# Patient Record
Sex: FEMALE | Race: WHITE | Employment: OTHER | URBAN - METROPOLITAN AREA
[De-identification: names, ages, dates, MRNs, and addresses within clinical notes are randomized per-mention and may not be internally consistent; named-entity substitution may affect disease eponyms.]

---

## 2017-02-27 RX ORDER — LOSARTAN POTASSIUM 50 MG/1
TABLET ORAL
Qty: 90 TABLET | Refills: 3 | Status: SHIPPED | OUTPATIENT
Start: 2017-02-27 | End: 2017-06-23

## 2017-03-03 RX ORDER — ALENDRONATE SODIUM 70 MG/1
TABLET ORAL
Qty: 12 TABLET | Refills: 3 | Status: SHIPPED | OUTPATIENT
Start: 2017-03-03 | End: 2017-05-15

## 2017-04-14 ENCOUNTER — TELEPHONE (OUTPATIENT)
Dept: INTERNAL MEDICINE CLINIC | Facility: CLINIC | Age: 77
End: 2017-04-14

## 2017-04-14 RX ORDER — AZITHROMYCIN 250 MG/1
TABLET, FILM COATED ORAL
Qty: 6 TABLET | Refills: 0 | Status: SHIPPED | OUTPATIENT
Start: 2017-04-14 | End: 2017-05-15

## 2017-04-14 RX ORDER — ALBUTEROL SULFATE 90 UG/1
2 AEROSOL, METERED RESPIRATORY (INHALATION) EVERY 4 HOURS PRN
Qty: 1 INHALER | Refills: 1 | Status: SHIPPED | OUTPATIENT
Start: 2017-04-14 | End: 2017-06-23

## 2017-04-14 NOTE — TELEPHONE ENCOUNTER
Called patient. She reports symptoms started 2 days ago and today she is feeling somewhat better. Does not want to be stuck over the holiday weekend without medication if needed. Denies fevers. Reports cough with minimal thin white sputum and runny nose.  [place]

## 2017-04-14 NOTE — TELEPHONE ENCOUNTER
I have pended a Z-Myron, and an albuterol inhaler above.   I would have her start using the albuterol inhaler at least 3-4 times a day, 2 puffs, and start the Z-Myron, she is still getting worse over the weekend she should be in the immediate care emergency enmanuel

## 2017-04-14 NOTE — TELEPHONE ENCOUNTER
Called patient and relayed information/instructions from Dr. Idalmis Warren. Patient verbalized understanding and is agreeable to plan. Med eRx'd.

## 2017-04-14 NOTE — TELEPHONE ENCOUNTER
Pt has runny nose and cough wants something for cough and runny nose - can not take liquid  meds    And asthma a little bad and wants an inhaler     [facility]     Pt can not come in today and is asking for something     Does not have car

## 2017-05-05 ENCOUNTER — TELEPHONE (OUTPATIENT)
Dept: INTERNAL MEDICINE CLINIC | Facility: CLINIC | Age: 77
End: 2017-05-05

## 2017-05-05 ENCOUNTER — APPOINTMENT (OUTPATIENT)
Dept: LAB | Facility: HOSPITAL | Age: 77
End: 2017-05-05
Attending: INTERNAL MEDICINE
Payer: MEDICARE

## 2017-05-05 DIAGNOSIS — N39.0 URINARY TRACT INFECTION, SITE UNSPECIFIED: ICD-10-CM

## 2017-05-05 DIAGNOSIS — N39.0 URINARY TRACT INFECTION, SITE UNSPECIFIED: Primary | ICD-10-CM

## 2017-05-05 PROCEDURE — 87086 URINE CULTURE/COLONY COUNT: CPT

## 2017-05-05 PROCEDURE — 81003 URINALYSIS AUTO W/O SCOPE: CPT

## 2017-05-05 NOTE — TELEPHONE ENCOUNTER
Please advise - called patient who states she has incontinence for years , but its getting worse , and if she is not close to a bathroom she cant hold it , urine just comes out - to DR. FAIRBANKS

## 2017-05-05 NOTE — TELEPHONE ENCOUNTER
Orders entered per DR. FAIRBANKS - called patient who put shannan Pathak on the phone - relayed MAGDI OVIEDO message and she verbalized understanding.  DR Jordon Blair # [phone] given to shannan

## 2017-05-05 NOTE — TELEPHONE ENCOUNTER
Get u/a and C&S today  - this problem is not my field and she needs to see urologist - refer to Dr Dane weems.    We will call her with results net week.  If not doing well i want he to go to ED

## 2017-05-05 NOTE — TELEPHONE ENCOUNTER
Pt said that she has a problem with her bladder. She wanted to see  today and have a test (urine?).                       Tasked to Nursing. high

## 2017-05-08 NOTE — TELEPHONE ENCOUNTER
I did d/w pt - u/a and culture are negative. She has appointment to see Dr Albert Ernst 5/25. She has occasional incontinence.

## 2017-05-15 ENCOUNTER — OFFICE VISIT (OUTPATIENT)
Dept: INTERNAL MEDICINE CLINIC | Facility: CLINIC | Age: 77
End: 2017-05-15

## 2017-05-15 ENCOUNTER — LAB ENCOUNTER (OUTPATIENT)
Dept: LAB | Age: 77
End: 2017-05-15
Attending: INTERNAL MEDICINE
Payer: MEDICARE

## 2017-05-15 ENCOUNTER — PRIOR ORIGINAL RECORDS (OUTPATIENT)
Dept: OTHER | Age: 77
End: 2017-05-15

## 2017-05-15 VITALS
TEMPERATURE: 98 F | WEIGHT: 175 LBS | SYSTOLIC BLOOD PRESSURE: 164 MMHG | DIASTOLIC BLOOD PRESSURE: 70 MMHG | HEIGHT: 61 IN | BODY MASS INDEX: 33.04 KG/M2 | HEART RATE: 60 BPM

## 2017-05-15 DIAGNOSIS — R32 URINARY INCONTINENCE, UNSPECIFIED TYPE: ICD-10-CM

## 2017-05-15 DIAGNOSIS — E55.9 VITAMIN D DEFICIENCY: ICD-10-CM

## 2017-05-15 DIAGNOSIS — I10 ESSENTIAL HYPERTENSION: ICD-10-CM

## 2017-05-15 DIAGNOSIS — Z00.00 ENCOUNTER FOR MEDICARE ANNUAL WELLNESS EXAM: ICD-10-CM

## 2017-05-15 DIAGNOSIS — E78.00 HYPERCHOLESTEREMIA: ICD-10-CM

## 2017-05-15 DIAGNOSIS — R10.13 EPIGASTRIC PAIN: Primary | ICD-10-CM

## 2017-05-15 DIAGNOSIS — R10.13 EPIGASTRIC PAIN: ICD-10-CM

## 2017-05-15 DIAGNOSIS — R06.00 DYSPNEA, UNSPECIFIED TYPE: ICD-10-CM

## 2017-05-15 PROBLEM — R10.9 ABDOMINAL PAIN: Status: ACTIVE | Noted: 2017-05-15

## 2017-05-15 PROCEDURE — G0439 PPPS, SUBSEQ VISIT: HCPCS | Performed by: INTERNAL MEDICINE

## 2017-05-15 PROCEDURE — 36415 COLL VENOUS BLD VENIPUNCTURE: CPT

## 2017-05-15 PROCEDURE — 85025 COMPLETE CBC W/AUTO DIFF WBC: CPT

## 2017-05-15 PROCEDURE — 80061 LIPID PANEL: CPT

## 2017-05-15 PROCEDURE — 84443 ASSAY THYROID STIM HORMONE: CPT

## 2017-05-15 PROCEDURE — 80053 COMPREHEN METABOLIC PANEL: CPT

## 2017-05-15 PROCEDURE — G0463 HOSPITAL OUTPT CLINIC VISIT: HCPCS | Performed by: INTERNAL MEDICINE

## 2017-05-15 PROCEDURE — 82306 VITAMIN D 25 HYDROXY: CPT

## 2017-05-15 PROCEDURE — 99214 OFFICE O/P EST MOD 30 MIN: CPT | Performed by: INTERNAL MEDICINE

## 2017-05-15 RX ORDER — OMEPRAZOLE 20 MG/1
20 CAPSULE, DELAYED RELEASE ORAL
Qty: 30 CAPSULE | Refills: 3 | Status: SHIPPED | OUTPATIENT
Start: 2017-05-15 | End: 2017-06-09

## 2017-05-15 NOTE — PROGRESS NOTES
Antionette Hodges is a 76year old female. HPI:   She is here for annual wellness [facility] evaluation - she has several complaints including minor dyspnea with mild exertion, tinnitus in the right ear and now the recent onset of epigastric discomfort as below. GENERAL HEALTH: feels well otherwise  SKIN: denies any unusual skin lesions or rashes  RESPIRATORY: denies shortness of breath with exertion  CARDIOVASCULAR: denies chest pain on exertion  GI: denies abdominal pain and denies heartburn  NEURO: denies hea No    Difficulty dressing or bathing?: No    Problems with daily activities? : No    Memory Problems?: No      Fall/Risk Assessment     Do you have 3 or more medical conditions?: 0-No    Have you fallen in the last 12 months?: 0-No    Do you accidently los I misunderstand what others are saying and make inappropriate responses:    No I avoid social activities because I cannot hear well and fear I will   reply improperly:  No   Family members and friends have told me they think I may have hearing   loss:   Junior Elaine 05/01/2015    No flowsheet data found. Pap and Pelvic      Pap: Every 3 yrs age 21-68 or Pap+HPV every 5 yrs age 33-67, age 72 and older at high risk   There are no preventive care reminders to display for this patient.  Update Health Maintenance if appli flowsheet data found. COPD      Spirometry Testing Annually No results found for this or any previous visit. No flowsheet data found. ASSESSMENT AND PLAN:   1.  Epigastric pain - no evident etiology, give empiric omeprazole 20 mg and recheck i

## 2017-05-17 ENCOUNTER — TELEPHONE (OUTPATIENT)
Dept: INTERNAL MEDICINE CLINIC | Facility: CLINIC | Age: 77
End: 2017-05-17

## 2017-05-17 LAB
ALBUMIN: 3.7 G/DL
ALKALINE PHOSPHATATE(ALK PHOS): 49 IU/L
ALT (SGPT): 14 U/L
AST (SGOT): 16 U/L
BILIRUBIN TOTAL: 1.3 MG/DL
BUN: 15 MG/DL
CALCIUM: 9.1 MG/DL
CHLORIDE: 108 MEQ/L
CHOLESTEROL, TOTAL: 169 MG/DL
CREATININE, SERUM: 1.08 MG/DL
GLOBULIN: 2.7 G/DL
GLUCOSE: 88 MG/DL
GLUCOSE: 88 MG/DL
HDL CHOLESTEROL: 42 MG/DL
HEMATOCRIT: 43.8 %
HEMOGLOBIN: 14.7 G/DL
LDL CHOLESTEROL: 97 MG/DL
MCH: 30.6 PG
MCHC: 33.5 G/DL
MCV: 91.4 FL
NON-HDL CHOLESTEROL: 127 MG/DL
PLATELETS: 176 K/UL
POTASSIUM, SERUM: 4.6 MEQ/L
PROTEIN, TOTAL: 6.4 G/DL
RED BLOOD COUNT: 4.79 X 10-6/U
SGOT (AST): 16 IU/L
SGPT (ALT): 14 IU/L
SODIUM: 144 MEQ/L
TRIGLYCERIDES: 152 MG/DL
WHITE BLOOD COUNT: 5.1 X 10-3/U

## 2017-05-18 ENCOUNTER — MYAURORA ACCOUNT LINK (OUTPATIENT)
Dept: OTHER | Age: 77
End: 2017-05-18

## 2017-05-18 ENCOUNTER — PRIOR ORIGINAL RECORDS (OUTPATIENT)
Dept: OTHER | Age: 77
End: 2017-05-18

## 2017-05-22 ENCOUNTER — HOSPITAL ENCOUNTER (OUTPATIENT)
Dept: RESPIRATORY THERAPY | Facility: HOSPITAL | Age: 77
Discharge: HOME | End: 2017-05-22
Attending: INTERNAL MEDICINE
Payer: MEDICARE

## 2017-05-22 DIAGNOSIS — R06.00 DYSPNEA: ICD-10-CM

## 2017-05-22 PROCEDURE — 94060 EVALUATION OF WHEEZING: CPT

## 2017-05-22 PROCEDURE — 94726 PLETHYSMOGRAPHY LUNG VOLUMES: CPT

## 2017-05-22 PROCEDURE — 94729 DIFFUSING CAPACITY: CPT

## 2017-05-24 ENCOUNTER — PRIOR ORIGINAL RECORDS (OUTPATIENT)
Dept: OTHER | Age: 77
End: 2017-05-24

## 2017-05-26 ENCOUNTER — OFFICE VISIT (OUTPATIENT)
Dept: SURGERY | Facility: CLINIC | Age: 77
End: 2017-05-26

## 2017-05-26 VITALS
HEIGHT: 61 IN | WEIGHT: 177 LBS | SYSTOLIC BLOOD PRESSURE: 156 MMHG | DIASTOLIC BLOOD PRESSURE: 79 MMHG | HEART RATE: 60 BPM | BODY MASS INDEX: 33.42 KG/M2 | RESPIRATION RATE: 16 BRPM | TEMPERATURE: 98 F

## 2017-05-26 DIAGNOSIS — N39.41 URGE INCONTINENCE: Primary | ICD-10-CM

## 2017-05-26 PROCEDURE — 99204 OFFICE O/P NEW MOD 45 MIN: CPT | Performed by: UROLOGY

## 2017-05-26 PROCEDURE — G0463 HOSPITAL OUTPT CLINIC VISIT: HCPCS | Performed by: UROLOGY

## 2017-05-26 RX ORDER — SOLIFENACIN SUCCINATE 5 MG/1
5 TABLET, FILM COATED ORAL DAILY
Qty: 30 TABLET | Refills: 11 | Status: SHIPPED | OUTPATIENT
Start: 2017-05-26 | End: 2017-06-23

## 2017-05-26 NOTE — PROGRESS NOTES
SUBJECTIVE:  Cadence Portillo is a 76year old female who presents for a consultation at the request of, and a copy of this note will be sent to, Dr. Raudel Hester, for evaluation of  Urinary urgency and urge incontinence.   Symptoms chronic but have been worse l (36.7 °C) (Oral)  Resp 16  Ht 5' 1\" (1.549 m)  Wt 177 lb (80.287 kg)  BMI 33.46 kg/m2  She appears well, in no apparent distress. Alert and oriented times three, pleasant and cooperative.   CARDIOVASCULAR:normal apical impulse  RESPIRATORY: no chest wall

## 2017-06-02 ENCOUNTER — OFFICE VISIT (OUTPATIENT)
Dept: OTOLARYNGOLOGY | Facility: CLINIC | Age: 77
End: 2017-06-02

## 2017-06-02 VITALS — HEIGHT: 61 IN | TEMPERATURE: 98 F | WEIGHT: 175 LBS | BODY MASS INDEX: 33.04 KG/M2

## 2017-06-02 DIAGNOSIS — H93.11 TINNITUS, RIGHT: Primary | ICD-10-CM

## 2017-06-02 PROCEDURE — G0463 HOSPITAL OUTPT CLINIC VISIT: HCPCS | Performed by: OTOLARYNGOLOGY

## 2017-06-02 PROCEDURE — 99213 OFFICE O/P EST LOW 20 MIN: CPT | Performed by: OTOLARYNGOLOGY

## 2017-06-03 NOTE — PROGRESS NOTES
Kaur Sullivan is a 76year old female. Patient presents with:  Ear Problem: ringing in ear right ear for past 7 months     HPI:   Developed ringing in her right ear last year. Noted to have an asymmetric hearing loss. MRI neg.  No real change in her he gain  SKIN: denies any unusual skin lesions or rashes  RESPIRATORY: denies shortness of breath with exertion  NEURO: denies headaches    EXAM:   Temp(Src) 97.7 °F (36.5 °C) (Tympanic)  Ht 5' 1\" (1.549 m)  Wt 175 lb (79.379 kg)  BMI 33.08 kg/m2  System [address]

## 2017-06-03 NOTE — PATIENT INSTRUCTIONS
Tinnitus (Ringing in the Ears)     Treatment may include maskers and hearing aids. Tinnitus is the term for a noise in your ear not caused by an outside sound. The noise might be a ringing, clicking, hiss, or roar.  It can vary in pitch and may be sof © 4028-7963 [address], [address] [place]. All rights reserved. This information is not intended as a substitute for professional medical care. Always follow your healthcare professional's instructions.

## 2017-06-04 ENCOUNTER — TELEPHONE (OUTPATIENT)
Dept: INTERNAL MEDICINE CLINIC | Facility: CLINIC | Age: 77
End: 2017-06-04

## 2017-06-04 DIAGNOSIS — R79.89 LOW VITAMIN D LEVEL: Primary | ICD-10-CM

## 2017-06-04 NOTE — TELEPHONE ENCOUNTER
Labs good but Vit D low - is she taking her Vit D 50,000 us once per week? ?  I suspect not. Maybe easier for her to get Vit D 5000 (five thousand) units and take once daily.  Repeat Vit D about 3 months

## 2017-06-05 NOTE — TELEPHONE ENCOUNTER
Called and Relayed MD's message to patient---verbalized understanding. Patient states she is taking Vitamin D 50,000units weekly, but does admit to occasionally forget to take it. Instructed per Dr. Bre Brady to repeat Vit D level in 3 months.  Order in place

## 2017-06-09 ENCOUNTER — OFFICE VISIT (OUTPATIENT)
Dept: INTERNAL MEDICINE CLINIC | Facility: CLINIC | Age: 77
End: 2017-06-09

## 2017-06-09 VITALS
DIASTOLIC BLOOD PRESSURE: 76 MMHG | HEIGHT: 61 IN | HEART RATE: 60 BPM | TEMPERATURE: 99 F | WEIGHT: 171.81 LBS | OXYGEN SATURATION: 96 % | SYSTOLIC BLOOD PRESSURE: 132 MMHG | BODY MASS INDEX: 32.44 KG/M2

## 2017-06-09 DIAGNOSIS — I10 ESSENTIAL HYPERTENSION: Primary | ICD-10-CM

## 2017-06-09 DIAGNOSIS — R06.00 DYSPNEA ON EXERTION: ICD-10-CM

## 2017-06-09 DIAGNOSIS — M25.552 PAIN, JOINT, HIP, LEFT: ICD-10-CM

## 2017-06-09 PROCEDURE — 99214 OFFICE O/P EST MOD 30 MIN: CPT | Performed by: INTERNAL MEDICINE

## 2017-06-09 PROCEDURE — G0463 HOSPITAL OUTPT CLINIC VISIT: HCPCS | Performed by: INTERNAL MEDICINE

## 2017-06-19 ENCOUNTER — PRIOR ORIGINAL RECORDS (OUTPATIENT)
Dept: OTHER | Age: 77
End: 2017-06-19

## 2017-06-19 ENCOUNTER — MYAURORA ACCOUNT LINK (OUTPATIENT)
Dept: OTHER | Age: 77
End: 2017-06-19

## 2017-06-22 ENCOUNTER — PRIOR ORIGINAL RECORDS (OUTPATIENT)
Dept: OTHER | Age: 77
End: 2017-06-22

## 2017-06-23 ENCOUNTER — OFFICE VISIT (OUTPATIENT)
Dept: SURGERY | Facility: CLINIC | Age: 77
End: 2017-06-23

## 2017-06-23 VITALS
DIASTOLIC BLOOD PRESSURE: 79 MMHG | TEMPERATURE: 99 F | BODY MASS INDEX: 32.28 KG/M2 | HEIGHT: 61.2 IN | HEART RATE: 54 BPM | SYSTOLIC BLOOD PRESSURE: 145 MMHG | WEIGHT: 171 LBS

## 2017-06-23 DIAGNOSIS — N39.41 URGE INCONTINENCE: Primary | ICD-10-CM

## 2017-06-23 PROCEDURE — 99213 OFFICE O/P EST LOW 20 MIN: CPT | Performed by: UROLOGY

## 2017-06-23 PROCEDURE — G0463 HOSPITAL OUTPT CLINIC VISIT: HCPCS | Performed by: UROLOGY

## 2017-06-23 RX ORDER — SOLIFENACIN SUCCINATE 5 MG/1
5 TABLET, FILM COATED ORAL DAILY
Qty: 90 TABLET | Refills: 3 | Status: SHIPPED | OUTPATIENT
Start: 2017-06-23 | End: 2017-09-22

## 2017-06-23 NOTE — PROGRESS NOTES
Silvia Pope is a 76year old female.     HPI:   Patient presents with:  Urinary Symptoms (urologic): patient presents for incontinence f/u is currently on vesicare 11g    75-year-old female with overactive bladder symptoms for which she was seen in c PHYSICAL EXAM:        ASSESSMENT/PLAN:   Assessment  Urge incontinence  (primary encounter diagnosis)    Reviewed findings with patient. Recommended follow-up in 6 months. She will call if she has any problems or concerns.          Orders This Visit

## 2017-06-27 ENCOUNTER — PRIOR ORIGINAL RECORDS (OUTPATIENT)
Dept: OTHER | Age: 77
End: 2017-06-27

## 2017-06-27 ENCOUNTER — MYAURORA ACCOUNT LINK (OUTPATIENT)
Dept: OTHER | Age: 77
End: 2017-06-27

## 2017-06-27 ENCOUNTER — TELEPHONE (OUTPATIENT)
Dept: INTERNAL MEDICINE CLINIC | Facility: CLINIC | Age: 77
End: 2017-06-27

## 2017-06-27 NOTE — TELEPHONE ENCOUNTER
Pt is calling for results of a Pulmonary test done a couple weeks ago done at [address] [place] [address] Dr Joon Neumann instructed pt to call Dr FAIRBANKS for results. Pt also needs results for hemoccult that was sent in. Please call pt [phone] pt will be home around 5 ok to [facility].   Tasked

## 2017-06-28 NOTE — TELEPHONE ENCOUNTER
I did leave message that PFT normal but Cologuard test positive.      Send her two Hemoccult slides and 4 tongue depressors with instruction to take two samples from one stool and make tnin smear on each of the panels on one card and then the same for a sec

## 2017-06-29 NOTE — TELEPHONE ENCOUNTER
Please call pt, has questions about results  Pt can be reached on cell at [phone]  Tasked to nursing

## 2017-06-29 NOTE — TELEPHONE ENCOUNTER
Patient called back. She states she would like to discuss abnormal test results with Dr. Ramandeep Villa in person. She did receive Dr. Jeanette Alves . Offered patient appt tomorrow afternoon at [facility], but she states she will call back in the morning.  States she just

## 2017-06-30 NOTE — TELEPHONE ENCOUNTER
Pt. Is calling to speak with Dr. Shelley Ferrell  Regarding her results & would like to be seen today. Pt.  Was offered to be seen at [facility], but she would like to come here there are no openings please advise    Ph. # [phone]   Routed  To clinical

## 2017-07-03 ENCOUNTER — OFFICE VISIT (OUTPATIENT)
Dept: INTERNAL MEDICINE CLINIC | Facility: CLINIC | Age: 77
End: 2017-07-03

## 2017-07-03 VITALS
DIASTOLIC BLOOD PRESSURE: 72 MMHG | WEIGHT: 172 LBS | TEMPERATURE: 99 F | HEIGHT: 61.2 IN | HEART RATE: 54 BPM | BODY MASS INDEX: 32.47 KG/M2 | OXYGEN SATURATION: 98 % | SYSTOLIC BLOOD PRESSURE: 120 MMHG

## 2017-07-03 DIAGNOSIS — Z80.0 FAMILY HISTORY OF COLON CANCER REQUIRING SCREENING COLONOSCOPY: Primary | ICD-10-CM

## 2017-07-03 PROCEDURE — G0463 HOSPITAL OUTPT CLINIC VISIT: HCPCS | Performed by: INTERNAL MEDICINE

## 2017-07-03 PROCEDURE — 99214 OFFICE O/P EST MOD 30 MIN: CPT | Performed by: INTERNAL MEDICINE

## 2017-07-03 RX ORDER — LISINOPRIL 40 MG/1
1 TABLET ORAL DAILY
COMMUNITY
Start: 2017-06-28 | End: 2018-11-02 | Stop reason: ALTCHOICE

## 2017-07-03 NOTE — PROGRESS NOTES
Prabha Molina is a 76year old female. HPI:   1.  Family history of colon cancer requiring screening colonoscopy -  Her mother  at age 39 of colon cancer, brother age 80 -  Dx colon cancer 3 yrs ago - pt has never had colonoscopy - she says she ab Comment: occasionally       REVIEW OF SYSTEMS:   GENERAL HEALTH: feels well otherwise  SKIN: denies any unusual skin lesions or rashes  RESPIRATORY: denies shortness of breath with exertion  CARDIOVASCULAR: denies chest pain on exertion  GI: denies abdomin

## 2017-07-04 ENCOUNTER — APPOINTMENT (OUTPATIENT)
Dept: LAB | Age: 77
End: 2017-07-04
Attending: INTERNAL MEDICINE
Payer: MEDICARE

## 2017-07-04 DIAGNOSIS — Z12.11 SCREENING FOR COLON CANCER: ICD-10-CM

## 2017-07-04 PROCEDURE — 82270 OCCULT BLOOD FECES: CPT

## 2017-07-08 LAB
HEMOCCULT STL QL: NEGATIVE
HEMOCCULT STL QL: NEGATIVE

## 2017-07-10 ENCOUNTER — TELEPHONE (OUTPATIENT)
Dept: INTERNAL MEDICINE CLINIC | Facility: CLINIC | Age: 77
End: 2017-07-10

## 2017-07-10 NOTE — TELEPHONE ENCOUNTER
Patient returned call and writer reiterated MDs message. Patient verbalized that she needs to call and follow up with Dr. Chanell Rivera.

## 2017-07-10 NOTE — TELEPHONE ENCOUNTER
[phone]  Pt would like to speak to Dr FAIRBANKS about colonoscopy prep. Pt can't do liquid and would rather swallow pill.  Pt doesn't want to make appt to discuss w/ Dr Jewel Daniel  To clinical

## 2017-07-10 NOTE — TELEPHONE ENCOUNTER
I did leave message on VM - I feel she needs to see Dr Jayro Rodriguez to make a plan and I had already spoken to Dr Jayro Rodriguez about her special problem of preparation for colonoscopy.

## 2017-09-01 RX ORDER — PRAVASTATIN SODIUM 40 MG
TABLET ORAL
Qty: 90 TABLET | Refills: 3 | Status: SHIPPED | OUTPATIENT
Start: 2017-09-01 | End: 2018-09-23

## 2017-09-22 ENCOUNTER — OFFICE VISIT (OUTPATIENT)
Dept: INTERNAL MEDICINE CLINIC | Facility: CLINIC | Age: 77
End: 2017-09-22

## 2017-09-22 VITALS
HEIGHT: 60 IN | OXYGEN SATURATION: 98 % | HEART RATE: 53 BPM | SYSTOLIC BLOOD PRESSURE: 124 MMHG | BODY MASS INDEX: 33.67 KG/M2 | WEIGHT: 171.5 LBS | DIASTOLIC BLOOD PRESSURE: 80 MMHG | TEMPERATURE: 98 F

## 2017-09-22 DIAGNOSIS — Z12.39 BREAST CANCER SCREENING: ICD-10-CM

## 2017-09-22 DIAGNOSIS — E78.00 HYPERCHOLESTEREMIA: ICD-10-CM

## 2017-09-22 DIAGNOSIS — I10 ESSENTIAL HYPERTENSION: ICD-10-CM

## 2017-09-22 DIAGNOSIS — R06.00 DYSPNEA ON EXERTION: Primary | ICD-10-CM

## 2017-09-22 PROCEDURE — 99214 OFFICE O/P EST MOD 30 MIN: CPT | Performed by: INTERNAL MEDICINE

## 2017-09-22 PROCEDURE — G0463 HOSPITAL OUTPT CLINIC VISIT: HCPCS | Performed by: INTERNAL MEDICINE

## 2017-09-22 RX ORDER — SOLIFENACIN SUCCINATE 5 MG/1
5 TABLET, FILM COATED ORAL DAILY
Qty: 90 TABLET | Refills: 3 | Status: SHIPPED | OUTPATIENT
Start: 2017-09-22 | End: 2017-10-10

## 2017-09-22 RX ORDER — AMITRIPTYLINE HYDROCHLORIDE 10 MG/1
10 TABLET, FILM COATED ORAL NIGHTLY
Qty: 30 TABLET | Refills: 6 | Status: SHIPPED | OUTPATIENT
Start: 2017-09-22 | End: 2017-10-10

## 2017-09-22 NOTE — PROGRESS NOTES
Prabha Molina is a 76year old female. HPI:     Prabha Molina is a 76year old female. HPI:   She is doing well - no major complaints no ED or UC visits. She c/o mild lightheadedness, no falls.     Headaches: periodic right parietal burning - Smokeless tobacco: Never Used                      Alcohol use: Yes              Comment: occasionally.  a couple drinks a month       REVIEW OF SYSTEMS:   GENERAL HEALTH: feels well otherwise  SKIN: denies any unusual skin lesions or rashes  RESPIR Tab Take 1 tablet by mouth daily. Disp:  Rfl:    Solifenacin Succinate (VESICARE) 5 MG Oral Tab Take 1 tablet (5 mg total) by mouth daily.  (Patient taking differently: Take 5 mg by mouth every other day.  ) Disp: 90 tablet Rfl: 3   ERGOCALCIFEROL 56982 U meds      Headache - trial Elavil 10 mg HS, she has had neg MRI, carotids, etc.      The patient indicates understanding of these issues and agrees to the plan.   The patient is asked to return in 4 months

## 2017-09-25 ENCOUNTER — TELEPHONE (OUTPATIENT)
Dept: INTERNAL MEDICINE CLINIC | Facility: CLINIC | Age: 77
End: 2017-09-25

## 2017-09-25 NOTE — TELEPHONE ENCOUNTER
[facility] for Amitriptyline HCL 10 mg. T: [phone], ID# [id number]. Placed in purple folder.     Tasked to Rx

## 2017-09-29 NOTE — TELEPHONE ENCOUNTER
[employer] faxed a note - pt is not enrolled in their plan. Fax placed to be scanned.        Tasked to Rx

## 2017-10-03 ENCOUNTER — HOSPITAL ENCOUNTER (OUTPATIENT)
Dept: MAMMOGRAPHY | Facility: HOSPITAL | Age: 77
Discharge: HOME | End: 2017-10-03
Attending: INTERNAL MEDICINE
Payer: MEDICARE

## 2017-10-03 DIAGNOSIS — Z12.39 BREAST CANCER SCREENING: ICD-10-CM

## 2017-10-03 PROCEDURE — 77067 SCR MAMMO BI INCL CAD: CPT | Performed by: INTERNAL MEDICINE

## 2017-10-04 NOTE — TELEPHONE ENCOUNTER
[facility] faxed the PA request again. Note: \" [employer] fax # [phone] Pt and claim on file. Placed in purple folder.        Tasked to rx

## 2017-10-09 NOTE — TELEPHONE ENCOUNTER
[employer] faxed a Notice of approval - Amitriptyline HCL approved effective 12/30/16 through 12/31/17. Fax placed to be scanned.      Tasked to rx

## 2017-10-10 ENCOUNTER — TELEPHONE (OUTPATIENT)
Dept: INTERNAL MEDICINE CLINIC | Facility: CLINIC | Age: 77
End: 2017-10-10

## 2017-10-10 ENCOUNTER — OFFICE VISIT (OUTPATIENT)
Dept: INTERNAL MEDICINE CLINIC | Facility: CLINIC | Age: 77
End: 2017-10-10

## 2017-10-10 ENCOUNTER — HOSPITAL ENCOUNTER (OUTPATIENT)
Dept: GENERAL RADIOLOGY | Age: 77
Discharge: HOME | End: 2017-10-10
Attending: INTERNAL MEDICINE | Admitting: INTERNAL MEDICINE
Payer: MEDICARE

## 2017-10-10 VITALS
DIASTOLIC BLOOD PRESSURE: 84 MMHG | SYSTOLIC BLOOD PRESSURE: 130 MMHG | HEIGHT: 60 IN | WEIGHT: 174 LBS | HEART RATE: 72 BPM | TEMPERATURE: 98 F | BODY MASS INDEX: 34.16 KG/M2

## 2017-10-10 DIAGNOSIS — M54.50 ACUTE LOW BACK PAIN WITHOUT SCIATICA, UNSPECIFIED BACK PAIN LATERALITY: ICD-10-CM

## 2017-10-10 DIAGNOSIS — M54.50 ACUTE LOW BACK PAIN WITHOUT SCIATICA, UNSPECIFIED BACK PAIN LATERALITY: Primary | ICD-10-CM

## 2017-10-10 PROCEDURE — 72110 X-RAY EXAM L-2 SPINE 4/>VWS: CPT | Performed by: INTERNAL MEDICINE

## 2017-10-10 PROCEDURE — 99214 OFFICE O/P EST MOD 30 MIN: CPT | Performed by: INTERNAL MEDICINE

## 2017-10-10 PROCEDURE — G0463 HOSPITAL OUTPT CLINIC VISIT: HCPCS | Performed by: INTERNAL MEDICINE

## 2017-10-10 RX ORDER — METHYLPREDNISOLONE 4 MG/1
TABLET ORAL
Qty: 1 KIT | Refills: 0 | Status: SHIPPED | OUTPATIENT
Start: 2017-10-10 | End: 2018-01-22 | Stop reason: ALTCHOICE

## 2017-10-10 RX ORDER — TRAMADOL HYDROCHLORIDE 50 MG/1
25 TABLET ORAL EVERY 6 HOURS PRN
Qty: 30 TABLET | Refills: 1 | Status: SHIPPED
Start: 2017-10-10 | End: 2018-01-22

## 2017-10-10 NOTE — PROGRESS NOTES
Mckenzie Prasad is a 76year old female who presents for     Back pain. \"I always have some pain in my back. (since 70 yrs old). Now (since [place] 10/8/17) is worse. \"  Pain is in the midline of the low back traveling out to both sides of the low ba 174 lb (78.9 kg)  09/22/17 : 171 lb 8 oz (77.8 kg)  07/03/17 : 172 lb (78 kg)  06/23/17 : 171 lb (77.6 kg)  06/09/17 : 171 lb 12.8 oz (77.9 kg)  06/02/17 : 175 lb (79.4 kg)    bp on my recheck is 130/84. Was 150/86 on initial vitals.    General: appears wel

## 2017-10-10 NOTE — TELEPHONE ENCOUNTER
To nursing, please tell patient x-ray of the lumbar spine done on 10/10/17–results show degenerative arthritis. Avoid excessive lifting or bending.   Go ahead with the plan as we discussed as her visit today for Medrol Dosepak and tramadol for pain if need

## 2017-10-11 PROBLEM — Z80.0 FAMILY HISTORY OF COLON CANCER IN MOTHER: Status: ACTIVE | Noted: 2017-07-03

## 2017-10-11 NOTE — TELEPHONE ENCOUNTER
Pt notified  LS spine shows degenerative arthritis / DR F   Avoid excessive lifting or bending   Go ahead with the medrol dose deanna  And tramadol for pain   Will call when urine report is back

## 2017-10-30 ENCOUNTER — HOSPITAL ENCOUNTER (OUTPATIENT)
Facility: HOSPITAL | Age: 77
Setting detail: HOSPITAL OUTPATIENT SURGERY
Discharge: HOME | End: 2017-10-30
Attending: INTERNAL MEDICINE | Admitting: INTERNAL MEDICINE
Payer: MEDICARE

## 2017-10-30 ENCOUNTER — SURGERY (OUTPATIENT)
Age: 77
End: 2017-10-30

## 2017-10-30 VITALS
HEART RATE: 78 BPM | RESPIRATION RATE: 16 BRPM | HEIGHT: 61 IN | SYSTOLIC BLOOD PRESSURE: 148 MMHG | WEIGHT: 171 LBS | DIASTOLIC BLOOD PRESSURE: 71 MMHG | BODY MASS INDEX: 32.28 KG/M2 | OXYGEN SATURATION: 92 %

## 2017-10-30 DIAGNOSIS — R19.5 HEME + STOOL: ICD-10-CM

## 2017-10-30 DIAGNOSIS — Z80.0 FAMILY HISTORY OF COLON CANCER IN MOTHER: ICD-10-CM

## 2017-10-30 PROCEDURE — 88305 TISSUE EXAM BY PATHOLOGIST: CPT | Performed by: INTERNAL MEDICINE

## 2017-10-30 PROCEDURE — 0DBK8ZX EXCISION OF ASCENDING COLON, VIA NATURAL OR ARTIFICIAL OPENING ENDOSCOPIC, DIAGNOSTIC: ICD-10-PCS | Performed by: INTERNAL MEDICINE

## 2017-10-30 PROCEDURE — 3E0H8GC INTRODUCTION OF OTHER THERAPEUTIC SUBSTANCE INTO LOWER GI, VIA NATURAL OR ARTIFICIAL OPENING ENDOSCOPIC: ICD-10-PCS | Performed by: INTERNAL MEDICINE

## 2017-10-30 PROCEDURE — 0DBM8ZX EXCISION OF DESCENDING COLON, VIA NATURAL OR ARTIFICIAL OPENING ENDOSCOPIC, DIAGNOSTIC: ICD-10-PCS | Performed by: INTERNAL MEDICINE

## 2017-10-30 PROCEDURE — 84100 ASSAY OF PHOSPHORUS: CPT | Performed by: INTERNAL MEDICINE

## 2017-10-30 PROCEDURE — 80048 BASIC METABOLIC PNL TOTAL CA: CPT | Performed by: INTERNAL MEDICINE

## 2017-10-30 PROCEDURE — 0DBL8ZX EXCISION OF TRANSVERSE COLON, VIA NATURAL OR ARTIFICIAL OPENING ENDOSCOPIC, DIAGNOSTIC: ICD-10-PCS | Performed by: INTERNAL MEDICINE

## 2017-10-30 RX ORDER — POTASSIUM CHLORIDE 1.5 G/1.77G
40 POWDER, FOR SOLUTION ORAL ONCE
Status: DISCONTINUED | OUTPATIENT
Start: 2017-10-30 | End: 2017-10-30

## 2017-10-30 RX ORDER — SODIUM CHLORIDE, SODIUM LACTATE, POTASSIUM CHLORIDE, CALCIUM CHLORIDE 600; 310; 30; 20 MG/100ML; MG/100ML; MG/100ML; MG/100ML
INJECTION, SOLUTION INTRAVENOUS CONTINUOUS
Status: DISCONTINUED | OUTPATIENT
Start: 2017-10-30 | End: 2017-10-30

## 2017-10-30 RX ORDER — SODIUM CHLORIDE 0.9 % (FLUSH) 0.9 %
10 SYRINGE (ML) INJECTION AS NEEDED
Status: DISCONTINUED | OUTPATIENT
Start: 2017-10-30 | End: 2017-10-30

## 2017-10-30 RX ORDER — MIDAZOLAM HYDROCHLORIDE 1 MG/ML
1 INJECTION INTRAMUSCULAR; INTRAVENOUS EVERY 5 MIN PRN
Status: DISCONTINUED | OUTPATIENT
Start: 2017-10-30 | End: 2017-10-30

## 2017-10-30 RX ORDER — MIDAZOLAM HYDROCHLORIDE 1 MG/ML
INJECTION INTRAMUSCULAR; INTRAVENOUS
Status: DISCONTINUED | OUTPATIENT
Start: 2017-10-30 | End: 2017-10-30

## 2017-10-30 RX ORDER — SOLIFENACIN SUCCINATE 5 MG/1
5 TABLET, FILM COATED ORAL AS NEEDED
COMMUNITY
End: 2019-09-03

## 2017-10-30 NOTE — H&P
RE-PROCEDURE UPDATE    HPI: Nicolasa Mejias is a 76year old female. 1/21/1940. Patient presents for a colonoscopy. ALLERGIES: No Known Allergies      No current outpatient prescriptions on file.   Past Medical History:   Diagnosis Date   • Asthma

## 2017-10-30 NOTE — OPERATIVE REPORT
COLONOSCOPY REPORT    Patient Name:  Antoine Luna Record #: [id number]  YOB: 1940  Date of Procedure: 10/30/2017    Referring physician: Fritz Curling. Eleuterio Anaya MD    Surgeon:  Indu Tinajero.  Meryle Nailer, MD    Pre-op diagnosis: Colon cancer s suctioned, but >90% of mucosa seen)  2 Good (clear liquid covering up to 25% of mucosa, but >90% of mucosa seen)  1 Excellent (>95% of mucosa seen)

## 2017-10-30 NOTE — PROGRESS NOTES
K+ = 3.1  Ordered potassium chloride (KLOR-CON) powder packet 40 mEq x 1 dose only. {SCr: 1.07; CrCl ~ 32 ml/min]    Per Adult Electrolyte Replacement Focused Protocol, patient needs KCL 40 mEq po x 3 doses.      Marion Woody, [facility]

## 2017-11-06 ENCOUNTER — TELEPHONE (OUTPATIENT)
Dept: INTERNAL MEDICINE CLINIC | Facility: CLINIC | Age: 77
End: 2017-11-06

## 2017-11-07 NOTE — TELEPHONE ENCOUNTER
Spoke with patient. See TT from 11/1/17, started by Dr. Quin Leos. Dr. Brii Mccauley wrote a message to be relayed to patient as well in that encounter.

## 2017-12-18 ENCOUNTER — MED REC SCAN ONLY (OUTPATIENT)
Dept: INTERNAL MEDICINE CLINIC | Facility: CLINIC | Age: 77
End: 2017-12-18

## 2018-01-12 RX ORDER — METOPROLOL TARTRATE 100 MG/1
TABLET ORAL
Qty: 180 TABLET | Refills: 3 | Status: SHIPPED | OUTPATIENT
Start: 2018-01-12 | End: 2018-01-22

## 2018-01-22 ENCOUNTER — LAB ENCOUNTER (OUTPATIENT)
Dept: LAB | Age: 78
End: 2018-01-22
Attending: INTERNAL MEDICINE
Payer: MEDICARE

## 2018-01-22 ENCOUNTER — OFFICE VISIT (OUTPATIENT)
Dept: INTERNAL MEDICINE CLINIC | Facility: CLINIC | Age: 78
End: 2018-01-22

## 2018-01-22 VITALS
DIASTOLIC BLOOD PRESSURE: 82 MMHG | BODY MASS INDEX: 33 KG/M2 | HEIGHT: 61 IN | SYSTOLIC BLOOD PRESSURE: 134 MMHG | TEMPERATURE: 99 F | WEIGHT: 174.81 LBS | OXYGEN SATURATION: 93 % | HEART RATE: 58 BPM

## 2018-01-22 DIAGNOSIS — D09.9 CARCINOMA IN SITU IN A POLYP: ICD-10-CM

## 2018-01-22 DIAGNOSIS — R79.89 LOW VITAMIN D LEVEL: ICD-10-CM

## 2018-01-22 DIAGNOSIS — E78.00 HYPERCHOLESTEREMIA: ICD-10-CM

## 2018-01-22 DIAGNOSIS — I10 ESSENTIAL HYPERTENSION: Primary | ICD-10-CM

## 2018-01-22 DIAGNOSIS — R06.00 DYSPNEA ON EXERTION: ICD-10-CM

## 2018-01-22 LAB
ALBUMIN SERPL BCP-MCNC: 3.7 G/DL (ref 3.5–4.8)
ALBUMIN/GLOB SERPL: 1.3 {RATIO} (ref 1–2)
ALP SERPL-CCNC: 52 U/L (ref 32–100)
ALT SERPL-CCNC: 14 U/L (ref 14–54)
ANION GAP SERPL CALC-SCNC: 9 MMOL/L (ref 0–18)
AST SERPL-CCNC: 18 U/L (ref 15–41)
BACTERIA UR QL AUTO: NEGATIVE /HPF
BASOPHILS # BLD: 0 K/UL (ref 0–0.2)
BASOPHILS NFR BLD: 1 %
BILIRUB SERPL-MCNC: 1.4 MG/DL (ref 0.3–1.2)
BILIRUB UR QL: NEGATIVE
BUN SERPL-MCNC: 13 MG/DL (ref 8–20)
BUN/CREAT SERPL: 13 (ref 10–20)
CALCIUM SERPL-MCNC: 9.3 MG/DL (ref 8.5–10.5)
CHLORIDE SERPL-SCNC: 108 MMOL/L (ref 95–110)
CHOLEST SERPL-MCNC: 167 MG/DL (ref 110–200)
CLARITY UR: CLEAR
CO2 SERPL-SCNC: 26 MMOL/L (ref 22–32)
COLOR UR: YELLOW
CREAT SERPL-MCNC: 1 MG/DL (ref 0.5–1.5)
EOSINOPHIL # BLD: 0.5 K/UL (ref 0–0.7)
EOSINOPHIL NFR BLD: 8 %
ERYTHROCYTE [DISTWIDTH] IN BLOOD BY AUTOMATED COUNT: 13.9 % (ref 11–15)
GLOBULIN PLAS-MCNC: 2.9 G/DL (ref 2.5–3.7)
GLUCOSE SERPL-MCNC: 81 MG/DL (ref 70–99)
GLUCOSE UR-MCNC: NEGATIVE MG/DL
HCT VFR BLD AUTO: 43.9 % (ref 35–48)
HDLC SERPL-MCNC: 46 MG/DL
HGB BLD-MCNC: 14.5 G/DL (ref 12–16)
KETONES UR-MCNC: NEGATIVE MG/DL
LDLC SERPL CALC-MCNC: 93 MG/DL (ref 0–99)
LEUKOCYTE ESTERASE UR QL STRIP.AUTO: NEGATIVE
LYMPHOCYTES # BLD: 1.9 K/UL (ref 1–4)
LYMPHOCYTES NFR BLD: 34 %
MCH RBC QN AUTO: 29.8 PG (ref 27–32)
MCHC RBC AUTO-ENTMCNC: 33 G/DL (ref 32–37)
MCV RBC AUTO: 90.5 FL (ref 80–100)
MONOCYTES # BLD: 0.5 K/UL (ref 0–1)
MONOCYTES NFR BLD: 9 %
NEUTROPHILS # BLD AUTO: 2.7 K/UL (ref 1.8–7.7)
NEUTROPHILS NFR BLD: 48 %
NITRITE UR QL STRIP.AUTO: NEGATIVE
NONHDLC SERPL-MCNC: 121 MG/DL
OSMOLALITY UR CALC.SUM OF ELEC: 295 MOSM/KG (ref 275–295)
PH UR: 5 [PH] (ref 5–8)
PLATELET # BLD AUTO: 176 K/UL (ref 140–400)
PMV BLD AUTO: 9.9 FL (ref 7.4–10.3)
POTASSIUM SERPL-SCNC: 4.1 MMOL/L (ref 3.3–5.1)
PROT SERPL-MCNC: 6.6 G/DL (ref 5.9–8.4)
PROT UR-MCNC: NEGATIVE MG/DL
RBC # BLD AUTO: 4.86 M/UL (ref 3.7–5.4)
RBC #/AREA URNS AUTO: 1 /HPF
SODIUM SERPL-SCNC: 143 MMOL/L (ref 136–144)
SP GR UR STRIP: 1.02 (ref 1–1.03)
TRIGL SERPL-MCNC: 139 MG/DL (ref 1–149)
TSH SERPL-ACNC: 1.54 UIU/ML (ref 0.45–5.33)
UROBILINOGEN UR STRIP-ACNC: <2
VIT C UR-MCNC: NEGATIVE MG/DL
WBC # BLD AUTO: 5.6 K/UL (ref 4–11)
WBC #/AREA URNS AUTO: <1 /HPF

## 2018-01-22 PROCEDURE — 36415 COLL VENOUS BLD VENIPUNCTURE: CPT

## 2018-01-22 PROCEDURE — 80053 COMPREHEN METABOLIC PANEL: CPT

## 2018-01-22 PROCEDURE — 81001 URINALYSIS AUTO W/SCOPE: CPT

## 2018-01-22 PROCEDURE — 82306 VITAMIN D 25 HYDROXY: CPT

## 2018-01-22 PROCEDURE — G0463 HOSPITAL OUTPT CLINIC VISIT: HCPCS | Performed by: INTERNAL MEDICINE

## 2018-01-22 PROCEDURE — 80061 LIPID PANEL: CPT

## 2018-01-22 PROCEDURE — 85025 COMPLETE CBC W/AUTO DIFF WBC: CPT

## 2018-01-22 PROCEDURE — 84443 ASSAY THYROID STIM HORMONE: CPT

## 2018-01-22 PROCEDURE — 99214 OFFICE O/P EST MOD 30 MIN: CPT | Performed by: INTERNAL MEDICINE

## 2018-01-22 NOTE — PROGRESS NOTES
Prabha Molina is a [de-identified]year old female. HPI:   She has been doing well - no major complaints - variable sharp ant and post chest pain - no sx suggestive of angina. Good energy good appetite. SR: no chest pain or sob, no gu or gi sx.         1. Es Smokeless tobacco: Never Used                      Alcohol use: Yes              Comment: occasionally.  a couple drinks a month       REVIEW OF SYSTEMS:   GENERAL HEALTH: feels well otherwise  SKIN

## 2018-01-26 LAB — 25(OH)D3 SERPL-MCNC: 21.5 NG/ML

## 2018-02-05 ENCOUNTER — TELEPHONE (OUTPATIENT)
Dept: INTERNAL MEDICINE CLINIC | Facility: CLINIC | Age: 78
End: 2018-02-05

## 2018-02-05 DIAGNOSIS — R79.89 LOW VITAMIN D LEVEL: Primary | ICD-10-CM

## 2018-02-05 RX ORDER — ERGOCALCIFEROL 1.25 MG/1
CAPSULE ORAL
Qty: 16 CAPSULE | Refills: 3 | Status: SHIPPED | OUTPATIENT
Start: 2018-02-05 | End: 2018-10-08

## 2018-02-05 NOTE — TELEPHONE ENCOUNTER
Vit D low - start Vit D 5000 us daily; labs including CBC, CMP, lipids, TSH,  and urine all normal - continue same meds same meds

## 2018-02-05 NOTE — TELEPHONE ENCOUNTER
Please advise -called patient and relayed DR. FAIRBANKS message - patient is already on 50.000 Units vitam in D weekly - to VIRGEN FAIRBANKS

## 2018-02-05 NOTE — TELEPHONE ENCOUNTER
Pt like a call back with in 30min she been waiting for her lab results for two weeks now. Pt have to leave her house.

## 2018-02-06 NOTE — TELEPHONE ENCOUNTER
Relayed MD message to patient. Patient verbalized understanding.  [facility]

## 2018-03-19 RX ORDER — LOSARTAN POTASSIUM 50 MG/1
TABLET ORAL
Qty: 90 TABLET | Refills: 3 | Status: SHIPPED | OUTPATIENT
Start: 2018-03-19 | End: 2019-06-24

## 2018-08-31 ENCOUNTER — TELEPHONE (OUTPATIENT)
Dept: INTERNAL MEDICINE CLINIC | Facility: CLINIC | Age: 78
End: 2018-08-31

## 2018-08-31 NOTE — TELEPHONE ENCOUNTER
Pt. Would like to speak with a nurse she has been trying to reach a physician she was referred to she was told she would get a call back and hasn't hear anything  Please advise  Ph. # [phone]   Routed to clinical

## 2018-09-20 NOTE — TELEPHONE ENCOUNTER
Called patient to follow up on message below. Spoke to patient. She said what she originally called about is taken care of. She got their phone number. She asked if she had an appt coming up with Dr Ramandeep Villa. She does not.   Transferred her to  t

## 2018-09-23 RX ORDER — PRAVASTATIN SODIUM 40 MG
TABLET ORAL
Qty: 90 TABLET | Refills: 3 | Status: SHIPPED | OUTPATIENT
Start: 2018-09-23 | End: 2019-10-03

## 2018-10-08 ENCOUNTER — OFFICE VISIT (OUTPATIENT)
Dept: INTERNAL MEDICINE CLINIC | Facility: CLINIC | Age: 78
End: 2018-10-08
Payer: MEDICARE

## 2018-10-08 VITALS
TEMPERATURE: 98 F | HEIGHT: 61 IN | BODY MASS INDEX: 33.24 KG/M2 | SYSTOLIC BLOOD PRESSURE: 138 MMHG | WEIGHT: 176.06 LBS | HEART RATE: 76 BPM | DIASTOLIC BLOOD PRESSURE: 86 MMHG | OXYGEN SATURATION: 98 %

## 2018-10-08 DIAGNOSIS — E78.00 HYPERCHOLESTEREMIA: ICD-10-CM

## 2018-10-08 DIAGNOSIS — I10 ESSENTIAL HYPERTENSION: Primary | ICD-10-CM

## 2018-10-08 DIAGNOSIS — R06.00 DYSPNEA ON EXERTION: ICD-10-CM

## 2018-10-08 DIAGNOSIS — D09.9 CARCINOMA IN SITU IN A POLYP: ICD-10-CM

## 2018-10-08 PROCEDURE — 99214 OFFICE O/P EST MOD 30 MIN: CPT | Performed by: INTERNAL MEDICINE

## 2018-10-08 PROCEDURE — G0463 HOSPITAL OUTPT CLINIC VISIT: HCPCS | Performed by: INTERNAL MEDICINE

## 2018-10-08 RX ORDER — ERGOCALCIFEROL 1.25 MG/1
CAPSULE ORAL
Qty: 16 CAPSULE | Refills: 3 | Status: SHIPPED | OUTPATIENT
Start: 2018-10-08 | End: 2020-09-14 | Stop reason: ALTCHOICE

## 2018-10-08 NOTE — PROGRESS NOTES
Mckenzie Prasad is a [de-identified]year old female. HPI:   Generally she is feeling well. She does have dyspnea on exertion, wheezing - this has been going on about 2 yrs. I gave her an inhaler but she does not remember if it helped. No PND or orthopnea.     She h breath with exertion  CARDIOVASCULAR: denies chest pain on exertion  GI: denies abdominal pain and denies heartburn  NEURO: denies headaches    EXAM:   /86 (BP Location: Left arm, Patient Position: Sitting, Cuff Size: adult)   Pulse 76   Temp 98.4 °F

## 2018-10-15 ENCOUNTER — MYAURORA ACCOUNT LINK (OUTPATIENT)
Dept: OTHER | Age: 78
End: 2018-10-15

## 2018-10-15 ENCOUNTER — PRIOR ORIGINAL RECORDS (OUTPATIENT)
Dept: OTHER | Age: 78
End: 2018-10-15

## 2018-10-29 ENCOUNTER — LAB ENCOUNTER (OUTPATIENT)
Dept: LAB | Facility: HOSPITAL | Age: 78
End: 2018-10-29
Attending: INTERNAL MEDICINE
Payer: MEDICARE

## 2018-10-29 DIAGNOSIS — R79.89 LOW VITAMIN D LEVEL: ICD-10-CM

## 2018-10-29 DIAGNOSIS — E78.00 HYPERCHOLESTEREMIA: ICD-10-CM

## 2018-10-29 PROCEDURE — 80053 COMPREHEN METABOLIC PANEL: CPT

## 2018-10-29 PROCEDURE — 82306 VITAMIN D 25 HYDROXY: CPT

## 2018-10-29 PROCEDURE — 80061 LIPID PANEL: CPT

## 2018-10-29 PROCEDURE — 84443 ASSAY THYROID STIM HORMONE: CPT

## 2018-10-29 PROCEDURE — 85025 COMPLETE CBC W/AUTO DIFF WBC: CPT

## 2018-10-29 PROCEDURE — 36415 COLL VENOUS BLD VENIPUNCTURE: CPT

## 2018-10-29 PROCEDURE — 81003 URINALYSIS AUTO W/O SCOPE: CPT | Performed by: INTERNAL MEDICINE

## 2018-11-02 ENCOUNTER — HOSPITAL ENCOUNTER (OUTPATIENT)
Dept: GENERAL RADIOLOGY | Facility: HOSPITAL | Age: 78
Discharge: HOME | End: 2018-11-02
Attending: INTERNAL MEDICINE
Payer: MEDICARE

## 2018-11-02 DIAGNOSIS — R06.00 DYSPNEA, UNSPECIFIED TYPE: ICD-10-CM

## 2018-11-02 PROCEDURE — 71046 X-RAY EXAM CHEST 2 VIEWS: CPT | Performed by: INTERNAL MEDICINE

## 2018-11-02 NOTE — PROGRESS NOTES
Call patient - normal CXR. No concerning findings. Continue with the trial of albuterol inhaler as planned.

## 2018-11-04 ENCOUNTER — TELEPHONE (OUTPATIENT)
Dept: INTERNAL MEDICINE CLINIC | Facility: CLINIC | Age: 78
End: 2018-11-04

## 2018-11-04 NOTE — TELEPHONE ENCOUNTER
All labs including CBC, CMP, lipids, Vit D, thyroid and urine all normal - continue same meds same meds

## 2018-11-09 ENCOUNTER — TELEPHONE (OUTPATIENT)
Dept: INTERNAL MEDICINE CLINIC | Facility: CLINIC | Age: 78
End: 2018-11-09

## 2018-11-09 RX ORDER — PROMETHAZINE HYDROCHLORIDE AND CODEINE PHOSPHATE 6.25; 1 MG/5ML; MG/5ML
5 SYRUP ORAL
Qty: 180 ML | Refills: 0 | COMMUNITY
Start: 2018-11-09 | End: 2019-03-26 | Stop reason: ALTCHOICE

## 2018-11-09 RX ORDER — CEFUROXIME AXETIL 500 MG/1
500 TABLET ORAL 2 TIMES DAILY
Qty: 14 TABLET | Refills: 0 | Status: SHIPPED | OUTPATIENT
Start: 2018-11-09 | End: 2019-03-26 | Stop reason: ALTCHOICE

## 2018-11-09 NOTE — TELEPHONE ENCOUNTER
Pt confirmed symptoms as documented below. Denied fever. Described \"deep cough\", not productive but pt noted \"not yet, it feels like I will be coughing stuff up soon\". Reported sinus and ear pressure, stated \"I just feel terrible\".  This RN informed t

## 2018-11-09 NOTE — TELEPHONE ENCOUNTER
Pt called back to check status on message  She is now running a fever - 100.2    Please advise [phone]

## 2018-11-09 NOTE — TELEPHONE ENCOUNTER
Bad cold since yesterday  Achey, sore throat, headache with bad cough  Wants to know if Dr Angie Haq would prescribe for her without being seen  [facility] [address] for call back from nursing [phone]

## 2018-11-09 NOTE — TELEPHONE ENCOUNTER
Pt is calling again to check on status of her earlier call, pt is very upset that she hasn't heard anything   Tasked to nursing high

## 2018-12-17 ENCOUNTER — OFFICE VISIT (OUTPATIENT)
Dept: INTERNAL MEDICINE CLINIC | Facility: CLINIC | Age: 78
End: 2018-12-17
Payer: MEDICARE

## 2018-12-17 VITALS
OXYGEN SATURATION: 93 % | WEIGHT: 174 LBS | SYSTOLIC BLOOD PRESSURE: 136 MMHG | TEMPERATURE: 99 F | BODY MASS INDEX: 34 KG/M2 | HEART RATE: 68 BPM | DIASTOLIC BLOOD PRESSURE: 90 MMHG

## 2018-12-17 DIAGNOSIS — D09.9 CARCINOMA IN SITU IN A POLYP: Primary | ICD-10-CM

## 2018-12-17 DIAGNOSIS — Z12.39 BREAST CANCER SCREENING: ICD-10-CM

## 2018-12-17 DIAGNOSIS — I10 ESSENTIAL HYPERTENSION: ICD-10-CM

## 2018-12-17 DIAGNOSIS — E78.00 HYPERCHOLESTEREMIA: ICD-10-CM

## 2018-12-17 DIAGNOSIS — Z78.0 POSTMENOPAUSAL: ICD-10-CM

## 2018-12-17 PROCEDURE — G0463 HOSPITAL OUTPT CLINIC VISIT: HCPCS | Performed by: INTERNAL MEDICINE

## 2018-12-17 PROCEDURE — 99214 OFFICE O/P EST MOD 30 MIN: CPT | Performed by: INTERNAL MEDICINE

## 2018-12-17 NOTE — PROGRESS NOTES
Mazin Alvarado is a [de-identified]year old female. HPI:   She has been doing well. No ED or UC visits.      She has continued wheezing and dyspnea - she uses albuterol inhaler - cannot afford Advair, etc. She saw Dr Wesly Arguelles in the past.     She is thinking of r CORTISONE INJECTION   • TIA (transient ischemic attack)       Social History:  Social History    Tobacco Use      Smoking status: Never Smoker      Smokeless tobacco: Never Used    Alcohol use: Yes      Comment: occasionally.  a couple drinks a month    Mauricio

## 2018-12-31 NOTE — TELEPHONE ENCOUNTER
Refill request is for a maintenance medication and has met the criteria specified in the Ambulatory Medication Refill Standing Order for eligibility, visits, laboratory, alerts and was sent to the requested pharmacy. Verbalized understanding of IMM     12/31/18 0841   Medicare Message   Important Message from Medicare regarding Discharge Appeal Rights Explained to patient/caregiver;Signed/date by patient/caregiver   Date IMM was signed 12/31/18   Time IMM was signed 0841

## 2019-01-07 ENCOUNTER — TELEPHONE (OUTPATIENT)
Dept: INTERNAL MEDICINE CLINIC | Facility: CLINIC | Age: 79
End: 2019-01-07

## 2019-01-07 NOTE — TELEPHONE ENCOUNTER
To Dr. Mcclellan Corporal - see below per niece Zo. Feels dizzy when blowing nose hard. Denies chest congestion, cough, sore throat. Denies fever/chills. Per pt: OK to give Zo pt medical and all information - Kurtis Golden is like a daughter to me\".   \"My nose is li

## 2019-01-07 NOTE — TELEPHONE ENCOUNTER
Pt niece Tj Piyush is calling pt have a sinus infection she is blowing her nose often feels congested and dizzy.  Like in rx pt cant drive

## 2019-02-07 RX ORDER — METOPROLOL TARTRATE 100 MG/1
TABLET ORAL
Qty: 180 TABLET | Refills: 3 | Status: SHIPPED | OUTPATIENT
Start: 2019-02-07 | End: 2020-02-03

## 2019-02-28 VITALS
BODY MASS INDEX: 33.04 KG/M2 | RESPIRATION RATE: 16 BRPM | SYSTOLIC BLOOD PRESSURE: 140 MMHG | HEIGHT: 61 IN | DIASTOLIC BLOOD PRESSURE: 80 MMHG | HEART RATE: 86 BPM | WEIGHT: 175 LBS

## 2019-02-28 VITALS
DIASTOLIC BLOOD PRESSURE: 88 MMHG | RESPIRATION RATE: 18 BRPM | OXYGEN SATURATION: 95 % | HEIGHT: 60 IN | HEART RATE: 58 BPM | BODY MASS INDEX: 33.57 KG/M2 | WEIGHT: 171 LBS | SYSTOLIC BLOOD PRESSURE: 162 MMHG

## 2019-03-01 VITALS
DIASTOLIC BLOOD PRESSURE: 88 MMHG | WEIGHT: 177 LBS | SYSTOLIC BLOOD PRESSURE: 156 MMHG | HEART RATE: 88 BPM | OXYGEN SATURATION: 94 % | BODY MASS INDEX: 34.75 KG/M2 | HEIGHT: 60 IN

## 2019-03-26 ENCOUNTER — OFFICE VISIT (OUTPATIENT)
Dept: INTERNAL MEDICINE CLINIC | Facility: CLINIC | Age: 79
End: 2019-03-26
Payer: MEDICARE

## 2019-03-26 ENCOUNTER — APPOINTMENT (OUTPATIENT)
Dept: LAB | Age: 79
End: 2019-03-26
Attending: INTERNAL MEDICINE
Payer: MEDICARE

## 2019-03-26 VITALS
OXYGEN SATURATION: 94 % | DIASTOLIC BLOOD PRESSURE: 86 MMHG | BODY MASS INDEX: 32.66 KG/M2 | RESPIRATION RATE: 16 BRPM | HEIGHT: 61 IN | HEART RATE: 69 BPM | TEMPERATURE: 98 F | SYSTOLIC BLOOD PRESSURE: 162 MMHG | WEIGHT: 173 LBS

## 2019-03-26 DIAGNOSIS — I10 ESSENTIAL HYPERTENSION: Primary | ICD-10-CM

## 2019-03-26 DIAGNOSIS — E78.00 HYPERCHOLESTEREMIA: ICD-10-CM

## 2019-03-26 LAB
CHOLEST SMN-MCNC: 208 MG/DL (ref ?–200)
HDLC SERPL-MCNC: 40 MG/DL (ref 40–59)
LDLC SERPL CALC-MCNC: 100 MG/DL (ref ?–100)
NONHDLC SERPL-MCNC: 168 MG/DL (ref ?–130)
TRIGL SERPL-MCNC: 339 MG/DL (ref 30–149)
VLDLC SERPL CALC-MCNC: 68 MG/DL (ref 0–30)

## 2019-03-26 PROCEDURE — G0463 HOSPITAL OUTPT CLINIC VISIT: HCPCS | Performed by: INTERNAL MEDICINE

## 2019-03-26 PROCEDURE — 80061 LIPID PANEL: CPT

## 2019-03-26 PROCEDURE — 36415 COLL VENOUS BLD VENIPUNCTURE: CPT

## 2019-03-26 PROCEDURE — 99214 OFFICE O/P EST MOD 30 MIN: CPT | Performed by: INTERNAL MEDICINE

## 2019-03-26 NOTE — PROGRESS NOTES
Vernon Lopes is a 78year old female. HPI:   She has been under tremendous family stress - coping OK but periodic vertex headaches. She is having some sleep disturbance. Eating OK.      HTN - she rarely self tests      SR: no chest pain or sob, no gu HEALTH: feels well otherwise  SKIN: denies any unusual skin lesions or rashes  RESPIRATORY: denies shortness of breath with exertion  CARDIOVASCULAR: denies chest pain on exertion  GI: denies abdominal pain and denies heartburn  NEURO: denies headaches

## 2019-03-31 ENCOUNTER — TELEPHONE (OUTPATIENT)
Dept: INTERNAL MEDICINE CLINIC | Facility: CLINIC | Age: 79
End: 2019-03-31

## 2019-03-31 DIAGNOSIS — E78.5 HYPERLIPIDEMIA, UNSPECIFIED HYPERLIPIDEMIA TYPE: ICD-10-CM

## 2019-03-31 DIAGNOSIS — Z00.00 HEALTH CARE MAINTENANCE: Primary | ICD-10-CM

## 2019-03-31 NOTE — TELEPHONE ENCOUNTER
Chol has gone up about 30 points, TG up about 150 points from last dermination. Chol 208 and  - would like to see 170 and 150 respectively. Is she taking her medication? ? Send AHA diet    Repeat lipids in 6 months with complete labs.

## 2019-04-08 ENCOUNTER — HOSPITAL ENCOUNTER (EMERGENCY)
Facility: HOSPITAL | Age: 79
Discharge: HOME | End: 2019-04-08
Attending: EMERGENCY MEDICINE
Payer: MEDICARE

## 2019-04-08 ENCOUNTER — TELEPHONE (OUTPATIENT)
Dept: INTERNAL MEDICINE CLINIC | Facility: CLINIC | Age: 79
End: 2019-04-08

## 2019-04-08 VITALS
WEIGHT: 177 LBS | TEMPERATURE: 98 F | OXYGEN SATURATION: 90 % | HEART RATE: 77 BPM | DIASTOLIC BLOOD PRESSURE: 70 MMHG | SYSTOLIC BLOOD PRESSURE: 129 MMHG | RESPIRATION RATE: 16 BRPM | BODY MASS INDEX: 33.42 KG/M2 | HEIGHT: 61 IN

## 2019-04-08 DIAGNOSIS — K52.9 GASTROENTERITIS: Primary | ICD-10-CM

## 2019-04-08 PROCEDURE — 96361 HYDRATE IV INFUSION ADD-ON: CPT | Performed by: EMERGENCY MEDICINE

## 2019-04-08 PROCEDURE — 83690 ASSAY OF LIPASE: CPT | Performed by: EMERGENCY MEDICINE

## 2019-04-08 PROCEDURE — 87086 URINE CULTURE/COLONY COUNT: CPT | Performed by: EMERGENCY MEDICINE

## 2019-04-08 PROCEDURE — 85025 COMPLETE CBC W/AUTO DIFF WBC: CPT | Performed by: EMERGENCY MEDICINE

## 2019-04-08 PROCEDURE — 96374 THER/PROPH/DIAG INJ IV PUSH: CPT | Performed by: EMERGENCY MEDICINE

## 2019-04-08 PROCEDURE — 99284 EMERGENCY DEPT VISIT MOD MDM: CPT | Performed by: EMERGENCY MEDICINE

## 2019-04-08 PROCEDURE — 80053 COMPREHEN METABOLIC PANEL: CPT | Performed by: EMERGENCY MEDICINE

## 2019-04-08 PROCEDURE — 81001 URINALYSIS AUTO W/SCOPE: CPT | Performed by: EMERGENCY MEDICINE

## 2019-04-08 RX ORDER — ONDANSETRON 4 MG/1
4 TABLET, ORALLY DISINTEGRATING ORAL EVERY 6 HOURS PRN
Qty: 10 TABLET | Refills: 0 | Status: SHIPPED | OUTPATIENT
Start: 2019-04-08 | End: 2019-04-15

## 2019-04-08 RX ORDER — ONDANSETRON 2 MG/ML
4 INJECTION INTRAMUSCULAR; INTRAVENOUS ONCE
Status: COMPLETED | OUTPATIENT
Start: 2019-04-08 | End: 2019-04-08

## 2019-04-08 NOTE — TELEPHONE ENCOUNTER
Altaf Reynolds doesn't feel good and he is taking her to [facility] ph. # [phone]  Routed high to clinical

## 2019-04-08 NOTE — TELEPHONE ENCOUNTER
Would encourage a BRAT diet; more hydration (pedialyte or a solution of 50/50 gatorade and water). If she is unable to hydrate, she will need to go to the ER for evaluation.  Would recommend that she come in for evaluation tomorrow if she is not feeling bet

## 2019-04-08 NOTE — TELEPHONE ENCOUNTER
Pt is calling with flu like symptoms. Symptoms include: diarrhea,  vomiting, no fever, some chills and sweating.    Been going on since this morning at 4am.     Best call back: [phone]

## 2019-04-08 NOTE — TELEPHONE ENCOUNTER
Called patient and relayed DR. KIDD message - verbalized understanding F/U 4/9 - if not better needs to be seen

## 2019-04-08 NOTE — TELEPHONE ENCOUNTER
Please advise - called patient who states she vomited once this morning  at 5 am and has diarrhea ( went 7 times already) , no appetite , abdominal discomfort before going to bathroom, denies fever, sweating . Has no one to bring her to office.  She is keren

## 2019-04-08 NOTE — ED PROVIDER NOTES
Patient Seen in: [facility] Emergency Department    History   Patient presents with:  Nausea/Vomiting/Diarrhea (gastrointestinal)    Stated Complaint: vomiting and diarrhea    HPI    78-year-old female presents for evaluation of vomiting and diarrhe reactive. Extraocular motions intact. Oropharynx clear. Neck: Supple  Lungs: Clear to auscultation bilaterally. Heart: Regular rate and rhythm. Abdomen: Soft, nontender. Skin: No rash. No edema. Neurologic: No focal neurologic deficits.   Normal s tolerate oral liquids after Zofran. She felt much better. Suspect a viral gastroenteritis. Patient will be given a prescription for Zofran. Okay to use Imodium at home. Return precautions reviewed with her and family.         Disposition and Plan     C

## 2019-04-09 NOTE — TELEPHONE ENCOUNTER
I would ask her to come in for evaluation. I would encourage hydration as much as possible, and continue the BRAT diet. I would not take any more imodium today. I need her to push fluids to make sure she is staying hydrated.

## 2019-04-09 NOTE — TELEPHONE ENCOUNTER
ER f/u: Feeling a little better today. No nausea today, but does have diarrhea x3 (like yellow water) since 4 AM, taking Imodium. Had a banana today and 2 glasses of water. Nursing to f/u later today.

## 2019-04-09 NOTE — TELEPHONE ENCOUNTER
Pt is calling back to speak to a nurse, she is not feeling better   (nurse not available) please call pt [phone]     Tasked to nursing

## 2019-04-09 NOTE — TELEPHONE ENCOUNTER
To Dr. Elaine Pedroza - see 4/8/19 ER note. See below -  later day f/u - pt states nausea is still absent. But diarrhea worse than yesterday - \"every 5 minutes like a faucet\" - light yellow, like water - denies foul odor, fever, chills, pain - lot of noise.  C/o

## 2019-04-09 NOTE — TELEPHONE ENCOUNTER
To Dr. Rachel Kelley - see messages below - Dr. Clarissa Oleary message relayed to pt who verbalized understanding. No openings with you tomorrow - appt made with Dr. Annie Melgar at 11:30AM tomorrow. Is this OK or do you want to see pt?

## 2019-04-10 ENCOUNTER — HOSPITAL ENCOUNTER (EMERGENCY)
Facility: HOSPITAL | Age: 79
Discharge: HOME | End: 2019-04-10
Attending: EMERGENCY MEDICINE
Payer: MEDICARE

## 2019-04-10 ENCOUNTER — APPOINTMENT (OUTPATIENT)
Dept: GENERAL RADIOLOGY | Facility: HOSPITAL | Age: 79
End: 2019-04-10
Attending: EMERGENCY MEDICINE
Payer: MEDICARE

## 2019-04-10 VITALS
TEMPERATURE: 99 F | SYSTOLIC BLOOD PRESSURE: 139 MMHG | DIASTOLIC BLOOD PRESSURE: 65 MMHG | HEIGHT: 61 IN | HEART RATE: 66 BPM | RESPIRATION RATE: 16 BRPM | OXYGEN SATURATION: 91 % | BODY MASS INDEX: 33.42 KG/M2 | WEIGHT: 177 LBS

## 2019-04-10 DIAGNOSIS — S92.411A CLOSED DISPLACED FRACTURE OF PROXIMAL PHALANX OF RIGHT GREAT TOE, INITIAL ENCOUNTER: Primary | ICD-10-CM

## 2019-04-10 PROCEDURE — 73610 X-RAY EXAM OF ANKLE: CPT | Performed by: EMERGENCY MEDICINE

## 2019-04-10 PROCEDURE — 99283 EMERGENCY DEPT VISIT LOW MDM: CPT

## 2019-04-10 PROCEDURE — 73630 X-RAY EXAM OF FOOT: CPT | Performed by: EMERGENCY MEDICINE

## 2019-04-10 RX ORDER — HYDROCODONE BITARTRATE AND ACETAMINOPHEN 5; 325 MG/1; MG/1
1 TABLET ORAL ONCE
Status: COMPLETED | OUTPATIENT
Start: 2019-04-10 | End: 2019-04-10

## 2019-04-10 RX ORDER — IBUPROFEN 400 MG/1
400 TABLET ORAL ONCE
Status: COMPLETED | OUTPATIENT
Start: 2019-04-10 | End: 2019-04-10

## 2019-04-10 NOTE — ED INITIAL ASSESSMENT (HPI)
Patient states she rolled her ankle and fell in bathroom yesterday, She has swelling and bruising to Rt great toe and an abrasion to Lt upper chest. No LOC, was able to get up herself.

## 2019-04-10 NOTE — ED PROVIDER NOTES
Patient Seen in: [facility] Emergency Department    History   Patient presents with:  Lower Extremity Injury (musculoskeletal)    Stated Complaint: toe injury    HPI    78-year-old white female who presents to the emergency room today for complaint kg/m²         Physical Exam    Well-developed well-nourished female who sitting on the gurney she is awake and alert she appears in some discomfort no acute distress.   Vital signs are stable she is afebrile  Right leg exam shows no hip pain noted to palpat will be discharged home to follow-up with her orthopedic surgeon. She is advised ice and elevate the foot. Take Tylenol Advil for pain. Return if symptoms worsen or new symptoms develop.             Disposition and Plan     Clinical Impression:  Closed d

## 2019-04-10 NOTE — TELEPHONE ENCOUNTER
Called patient who reports that she has to cancel her appt for today. She fell last night at home, did not think anything of it (didn't hit her head). Today she woke up and isn't able to apply pressure to RT foot, c/o pain. Thinks she fractured RT big toe.

## 2019-04-11 NOTE — TELEPHONE ENCOUNTER
To Dr. Silvana Johnson - ER f/u - diarrhea has cleared up, \"much better\". Pt was in ER last night for fx great toe - will be seeing ortho. Pt states she will call back Monday to make f/u appt with you. Next appt scheduled for 6/28/19.   To [facility] - please f/

## 2019-04-17 ENCOUNTER — TELEPHONE (OUTPATIENT)
Dept: INTERNAL MEDICINE CLINIC | Facility: CLINIC | Age: 79
End: 2019-04-17

## 2019-04-17 RX ORDER — AZITHROMYCIN 250 MG/1
TABLET, FILM COATED ORAL
Qty: 6 TABLET | Refills: 0 | Status: SHIPPED | OUTPATIENT
Start: 2019-04-17 | End: 2019-06-21 | Stop reason: ALTCHOICE

## 2019-04-17 NOTE — TELEPHONE ENCOUNTER
Ni Alvarado sent to [facility] with patient and notified her Amalia Karla was sent to her pharmacy. Instructed she let Dr Ana Jensen know if she is not feeling better. She verbalized understanding.

## 2019-04-17 NOTE — TELEPHONE ENCOUNTER
Please advise - called patient who started with URI 2 nights ago , runny nose , headache, coughing up brownish phlegm , some wheezing , denies fever, has no ride , also has broken toe and cannot drive . She is taking Mucinex - to DR. FAIRBANKS

## 2019-04-17 NOTE — TELEPHONE ENCOUNTER
Please call pt, has bad cold, cannot get to office because she cannot drive, cannot get a ride  Pt has congestion, headache, coughing up brown phlegm   Can medication be prescribed?   Pt uses [facility] [place] as pharmacy  Tasked to nursing

## 2019-06-21 ENCOUNTER — APPOINTMENT (OUTPATIENT)
Dept: LAB | Age: 79
End: 2019-06-21
Attending: INTERNAL MEDICINE
Payer: MEDICARE

## 2019-06-21 ENCOUNTER — OFFICE VISIT (OUTPATIENT)
Dept: INTERNAL MEDICINE CLINIC | Facility: CLINIC | Age: 79
End: 2019-06-21
Payer: MEDICARE

## 2019-06-21 ENCOUNTER — HOSPITAL ENCOUNTER (OUTPATIENT)
Dept: ULTRASOUND IMAGING | Facility: HOSPITAL | Age: 79
Discharge: HOME | End: 2019-06-21
Attending: INTERNAL MEDICINE
Payer: MEDICARE

## 2019-06-21 VITALS
HEIGHT: 61 IN | WEIGHT: 176 LBS | DIASTOLIC BLOOD PRESSURE: 76 MMHG | SYSTOLIC BLOOD PRESSURE: 158 MMHG | BODY MASS INDEX: 33.23 KG/M2 | HEART RATE: 59 BPM | RESPIRATION RATE: 18 BRPM | TEMPERATURE: 98 F | OXYGEN SATURATION: 94 %

## 2019-06-21 DIAGNOSIS — I10 ESSENTIAL HYPERTENSION: Primary | ICD-10-CM

## 2019-06-21 DIAGNOSIS — E13.9 DIABETES 1.5, MANAGED AS TYPE 2 (HCC): ICD-10-CM

## 2019-06-21 DIAGNOSIS — M79.89 PAIN AND SWELLING OF LEFT LOWER LEG: ICD-10-CM

## 2019-06-21 DIAGNOSIS — M79.89 PAIN AND SWELLING OF RIGHT LOWER LEG: ICD-10-CM

## 2019-06-21 DIAGNOSIS — E78.00 HYPERCHOLESTEREMIA: ICD-10-CM

## 2019-06-21 DIAGNOSIS — M79.661 PAIN AND SWELLING OF RIGHT LOWER LEG: ICD-10-CM

## 2019-06-21 DIAGNOSIS — M79.662 PAIN AND SWELLING OF LEFT LOWER LEG: ICD-10-CM

## 2019-06-21 PROCEDURE — 93970 EXTREMITY STUDY: CPT | Performed by: INTERNAL MEDICINE

## 2019-06-21 PROCEDURE — 80048 BASIC METABOLIC PNL TOTAL CA: CPT

## 2019-06-21 PROCEDURE — 36415 COLL VENOUS BLD VENIPUNCTURE: CPT

## 2019-06-21 PROCEDURE — G0463 HOSPITAL OUTPT CLINIC VISIT: HCPCS | Performed by: INTERNAL MEDICINE

## 2019-06-21 PROCEDURE — 80061 LIPID PANEL: CPT

## 2019-06-21 PROCEDURE — 83036 HEMOGLOBIN GLYCOSYLATED A1C: CPT

## 2019-06-21 PROCEDURE — 99214 OFFICE O/P EST MOD 30 MIN: CPT | Performed by: INTERNAL MEDICINE

## 2019-06-21 NOTE — PROGRESS NOTES
Prosper Maldonado is a 78year old female. HPI:   She is considerably improved physically compared to last visit although her family stress situation has actually gotten worse. Headaches are gone and she is sleeping better.         She has had increas tobacco: Never Used    Alcohol use: Yes      Comment: occasionally.  a couple drinks a month    Drug use: No       REVIEW OF SYSTEMS:   GENERAL HEALTH: feels well otherwise  SKIN: denies any unusual skin lesions or rashes  RESPIRATORY: denies shortness of b

## 2019-06-24 ENCOUNTER — TELEPHONE (OUTPATIENT)
Dept: INTERNAL MEDICINE CLINIC | Facility: CLINIC | Age: 79
End: 2019-06-24

## 2019-06-24 RX ORDER — LOSARTAN POTASSIUM 50 MG/1
TABLET ORAL
Qty: 90 TABLET | Refills: 3 | Status: SHIPPED | OUTPATIENT
Start: 2019-06-24 | End: 2020-09-17

## 2019-06-28 ENCOUNTER — TELEPHONE (OUTPATIENT)
Dept: INTERNAL MEDICINE CLINIC | Facility: CLINIC | Age: 79
End: 2019-06-28

## 2019-09-03 ENCOUNTER — OFFICE VISIT (OUTPATIENT)
Dept: INTERNAL MEDICINE CLINIC | Facility: CLINIC | Age: 79
End: 2019-09-03
Payer: MEDICARE

## 2019-09-03 VITALS
OXYGEN SATURATION: 93 % | SYSTOLIC BLOOD PRESSURE: 130 MMHG | WEIGHT: 177.81 LBS | BODY MASS INDEX: 34 KG/M2 | TEMPERATURE: 98 F | DIASTOLIC BLOOD PRESSURE: 88 MMHG | HEART RATE: 69 BPM

## 2019-09-03 DIAGNOSIS — I10 ESSENTIAL HYPERTENSION: Primary | ICD-10-CM

## 2019-09-03 DIAGNOSIS — R42 LIGHTHEADEDNESS: ICD-10-CM

## 2019-09-03 PROCEDURE — G0463 HOSPITAL OUTPT CLINIC VISIT: HCPCS | Performed by: INTERNAL MEDICINE

## 2019-09-03 PROCEDURE — 99214 OFFICE O/P EST MOD 30 MIN: CPT | Performed by: INTERNAL MEDICINE

## 2019-09-03 NOTE — PROGRESS NOTES
Gila Stearns is a 78year old female. HPI:   She is stable and no new issues. She is doing well with her personal issues and is now smiling and laughing again and seems to be back to her old personality.     Only complaint is occas imbalance but she shortness of breath with exertion  CARDIOVASCULAR: denies chest pain on exertion  GI: denies abdominal pain and denies heartburn  NEURO: denies headaches    EXAM:   /88   Pulse 69   Temp 98.3 °F (36.8 °C) (Oral)   Wt 177 lb 12.8 oz (80.6 kg)   SpO2 9

## 2019-10-04 RX ORDER — PRAVASTATIN SODIUM 40 MG
TABLET ORAL
Qty: 90 TABLET | Refills: 3 | Status: SHIPPED | OUTPATIENT
Start: 2019-10-04 | End: 2020-10-12

## 2019-11-26 ENCOUNTER — APPOINTMENT (OUTPATIENT)
Dept: ULTRASOUND IMAGING | Facility: HOSPITAL | Age: 79
End: 2019-11-26
Attending: EMERGENCY MEDICINE
Payer: MEDICARE

## 2019-11-26 ENCOUNTER — APPOINTMENT (OUTPATIENT)
Dept: CT IMAGING | Facility: HOSPITAL | Age: 79
End: 2019-11-26
Attending: EMERGENCY MEDICINE
Payer: MEDICARE

## 2019-11-26 ENCOUNTER — HOSPITAL ENCOUNTER (EMERGENCY)
Facility: HOSPITAL | Age: 79
Discharge: HOME | End: 2019-11-26
Attending: EMERGENCY MEDICINE
Payer: MEDICARE

## 2019-11-26 VITALS
HEIGHT: 61 IN | OXYGEN SATURATION: 96 % | TEMPERATURE: 98 F | RESPIRATION RATE: 19 BRPM | SYSTOLIC BLOOD PRESSURE: 150 MMHG | DIASTOLIC BLOOD PRESSURE: 64 MMHG | HEART RATE: 57 BPM | BODY MASS INDEX: 33.23 KG/M2 | WEIGHT: 176 LBS

## 2019-11-26 DIAGNOSIS — H81.10 BENIGN PAROXYSMAL POSITIONAL VERTIGO, UNSPECIFIED LATERALITY: Primary | ICD-10-CM

## 2019-11-26 DIAGNOSIS — J01.90 ACUTE SINUSITIS, RECURRENCE NOT SPECIFIED, UNSPECIFIED LOCATION: ICD-10-CM

## 2019-11-26 PROCEDURE — 99284 EMERGENCY DEPT VISIT MOD MDM: CPT

## 2019-11-26 PROCEDURE — 80053 COMPREHEN METABOLIC PANEL: CPT | Performed by: EMERGENCY MEDICINE

## 2019-11-26 PROCEDURE — 93971 EXTREMITY STUDY: CPT | Performed by: EMERGENCY MEDICINE

## 2019-11-26 PROCEDURE — 93005 ELECTROCARDIOGRAM TRACING: CPT

## 2019-11-26 PROCEDURE — 85025 COMPLETE CBC W/AUTO DIFF WBC: CPT | Performed by: EMERGENCY MEDICINE

## 2019-11-26 PROCEDURE — 93010 ELECTROCARDIOGRAM REPORT: CPT

## 2019-11-26 PROCEDURE — 99285 EMERGENCY DEPT VISIT HI MDM: CPT

## 2019-11-26 PROCEDURE — 96361 HYDRATE IV INFUSION ADD-ON: CPT

## 2019-11-26 PROCEDURE — 96374 THER/PROPH/DIAG INJ IV PUSH: CPT

## 2019-11-26 PROCEDURE — 70450 CT HEAD/BRAIN W/O DYE: CPT | Performed by: EMERGENCY MEDICINE

## 2019-11-26 RX ORDER — MECLIZINE HYDROCHLORIDE 25 MG/1
25 TABLET ORAL ONCE
Status: COMPLETED | OUTPATIENT
Start: 2019-11-26 | End: 2019-11-26

## 2019-11-26 RX ORDER — LORAZEPAM 2 MG/ML
0.5 INJECTION INTRAMUSCULAR ONCE
Status: COMPLETED | OUTPATIENT
Start: 2019-11-26 | End: 2019-11-26

## 2019-11-26 RX ORDER — SODIUM CHLORIDE 9 MG/ML
125 INJECTION, SOLUTION INTRAVENOUS CONTINUOUS
Status: DISCONTINUED | OUTPATIENT
Start: 2019-11-26 | End: 2019-11-26

## 2019-11-26 RX ORDER — DIAZEPAM 2 MG/1
2 TABLET ORAL 3 TIMES DAILY PRN
Qty: 15 TABLET | Refills: 0 | Status: SHIPPED | OUTPATIENT
Start: 2019-11-26

## 2019-11-26 RX ORDER — MECLIZINE HYDROCHLORIDE 25 MG/1
25 TABLET ORAL 3 TIMES DAILY PRN
Qty: 30 TABLET | Refills: 0 | Status: SHIPPED | OUTPATIENT
Start: 2019-11-26 | End: 2020-08-03

## 2019-11-26 RX ORDER — CEFDINIR 300 MG/1
300 CAPSULE ORAL 2 TIMES DAILY
Qty: 20 CAPSULE | Refills: 0 | Status: SHIPPED | OUTPATIENT
Start: 2019-11-26 | End: 2019-12-06

## 2020-01-03 ENCOUNTER — OFFICE VISIT (OUTPATIENT)
Dept: INTERNAL MEDICINE CLINIC | Facility: CLINIC | Age: 80
End: 2020-01-03
Payer: MEDICARE

## 2020-01-03 ENCOUNTER — APPOINTMENT (OUTPATIENT)
Dept: LAB | Age: 80
End: 2020-01-03
Attending: INTERNAL MEDICINE
Payer: MEDICARE

## 2020-01-03 VITALS
WEIGHT: 178 LBS | HEART RATE: 70 BPM | BODY MASS INDEX: 33.61 KG/M2 | SYSTOLIC BLOOD PRESSURE: 140 MMHG | DIASTOLIC BLOOD PRESSURE: 82 MMHG | HEIGHT: 61 IN | TEMPERATURE: 98 F | OXYGEN SATURATION: 92 %

## 2020-01-03 DIAGNOSIS — R73.9 ELEVATED BLOOD SUGAR: ICD-10-CM

## 2020-01-03 DIAGNOSIS — E13.9 DIABETES 1.5, MANAGED AS TYPE 2 (HCC): ICD-10-CM

## 2020-01-03 DIAGNOSIS — Z12.39 BREAST CANCER SCREENING: ICD-10-CM

## 2020-01-03 DIAGNOSIS — E78.00 HYPERCHOLESTEREMIA: Primary | ICD-10-CM

## 2020-01-03 DIAGNOSIS — E78.00 HYPERCHOLESTEREMIA: ICD-10-CM

## 2020-01-03 DIAGNOSIS — I10 ESSENTIAL HYPERTENSION: ICD-10-CM

## 2020-01-03 DIAGNOSIS — Z78.0 POSTMENOPAUSAL: ICD-10-CM

## 2020-01-03 LAB
ANION GAP SERPL CALC-SCNC: 7 MMOL/L (ref 0–18)
BUN BLD-MCNC: 23 MG/DL (ref 7–18)
BUN/CREAT SERPL: 19.8 (ref 10–20)
CALCIUM BLD-MCNC: 9.6 MG/DL (ref 8.5–10.1)
CHLORIDE SERPL-SCNC: 110 MMOL/L (ref 98–112)
CHOLEST SMN-MCNC: 218 MG/DL (ref ?–200)
CO2 SERPL-SCNC: 27 MMOL/L (ref 21–32)
CREAT BLD-MCNC: 1.16 MG/DL (ref 0.55–1.02)
EST. AVERAGE GLUCOSE BLD GHB EST-MCNC: 126 MG/DL (ref 68–126)
GLUCOSE BLD-MCNC: 91 MG/DL (ref 70–99)
HBA1C MFR BLD HPLC: 6 % (ref ?–5.7)
HDLC SERPL-MCNC: 51 MG/DL (ref 40–59)
LDLC SERPL CALC-MCNC: 113 MG/DL (ref ?–100)
NONHDLC SERPL-MCNC: 167 MG/DL (ref ?–130)
OSMOLALITY SERPL CALC.SUM OF ELEC: 301 MOSM/KG (ref 275–295)
PATIENT FASTING Y/N/NP: YES
PATIENT FASTING Y/N/NP: YES
POTASSIUM SERPL-SCNC: 4.5 MMOL/L (ref 3.5–5.1)
SODIUM SERPL-SCNC: 144 MMOL/L (ref 136–145)
TRIGL SERPL-MCNC: 269 MG/DL (ref 30–149)
TSI SER-ACNC: 2.13 MIU/ML (ref 0.36–3.74)
VLDLC SERPL CALC-MCNC: 54 MG/DL (ref 0–30)

## 2020-01-03 PROCEDURE — 80061 LIPID PANEL: CPT

## 2020-01-03 PROCEDURE — G0463 HOSPITAL OUTPT CLINIC VISIT: HCPCS | Performed by: INTERNAL MEDICINE

## 2020-01-03 PROCEDURE — 83036 HEMOGLOBIN GLYCOSYLATED A1C: CPT

## 2020-01-03 PROCEDURE — 80048 BASIC METABOLIC PNL TOTAL CA: CPT

## 2020-01-03 PROCEDURE — 84443 ASSAY THYROID STIM HORMONE: CPT

## 2020-01-03 PROCEDURE — 36415 COLL VENOUS BLD VENIPUNCTURE: CPT

## 2020-01-03 PROCEDURE — 99214 OFFICE O/P EST MOD 30 MIN: CPT | Performed by: INTERNAL MEDICINE

## 2020-01-03 NOTE — PROGRESS NOTES
Maritza Ortega is a 78year old female. HPI:   She went to ED on 11/26 for vertigo - had neg CT brain and was given Antivert and sx lasted about 1 week and now gone.  She had a bad attack of vertigo about 2 yrs ago and needed transport from my office t Comment: occasionally.  a couple drinks a month    Drug use: No       REVIEW OF SYSTEMS:   GENERAL HEALTH: feels well otherwise  SKIN: denies any unusual skin lesions or rashes  RESPIRATORY: denies shortness of breath with exertion  CARDIOVASCULAR: denies c

## 2020-01-09 ENCOUNTER — TELEPHONE (OUTPATIENT)
Dept: INTERNAL MEDICINE CLINIC | Facility: CLINIC | Age: 80
End: 2020-01-09

## 2020-01-09 NOTE — TELEPHONE ENCOUNTER
Saw Dr Moiz Choe last Friday    Requests call back (today if possible) with lab results [phone]

## 2020-01-09 NOTE — TELEPHONE ENCOUNTER
To Dr. Chery Guo-  Please advise on labs done on 1/3/2020. Thank you! Spoke to patient and informed her that Dr. Chery Guo is not in office today, pt OK with waiting until MD returns in office to review her labs.

## 2020-02-03 ENCOUNTER — TELEPHONE (OUTPATIENT)
Dept: INTERNAL MEDICINE CLINIC | Facility: CLINIC | Age: 80
End: 2020-02-03

## 2020-02-03 DIAGNOSIS — Z12.31 ENCOUNTER FOR SCREENING MAMMOGRAM FOR MALIGNANT NEOPLASM OF BREAST: Primary | ICD-10-CM

## 2020-02-03 DIAGNOSIS — E55.9 VITAMIN D DEFICIENCY: ICD-10-CM

## 2020-02-03 DIAGNOSIS — M81.0 OSTEOPOROSIS, UNSPECIFIED OSTEOPOROSIS TYPE, UNSPECIFIED PATHOLOGICAL FRACTURE PRESENCE: ICD-10-CM

## 2020-02-03 RX ORDER — METOPROLOL TARTRATE 100 MG/1
TABLET ORAL
Qty: 180 TABLET | Refills: 3 | Status: SHIPPED | OUTPATIENT
Start: 2020-02-03 | End: 2021-05-13

## 2020-02-03 NOTE — TELEPHONE ENCOUNTER
New order entered per protocol. Spoke with Kimberly Haque. While on the phone she checked to make sure diagnosis code did work.

## 2020-02-03 NOTE — TELEPHONE ENCOUNTER
New orders entered for mammo screen and dexa scan with new diagnosis. Called melinda at central scheduling and notified.

## 2020-02-25 ENCOUNTER — HOSPITAL ENCOUNTER (OUTPATIENT)
Dept: BONE DENSITY | Facility: HOSPITAL | Age: 80
Discharge: HOME | End: 2020-02-25
Attending: INTERNAL MEDICINE
Payer: MEDICARE

## 2020-02-25 DIAGNOSIS — Z12.39 BREAST CANCER SCREENING: ICD-10-CM

## 2020-02-25 DIAGNOSIS — Z78.0 POSTMENOPAUSAL: ICD-10-CM

## 2020-02-25 PROCEDURE — 77080 DXA BONE DENSITY AXIAL: CPT | Performed by: INTERNAL MEDICINE

## 2020-02-27 ENCOUNTER — TELEPHONE (OUTPATIENT)
Dept: INTERNAL MEDICINE CLINIC | Facility: CLINIC | Age: 80
End: 2020-02-27

## 2020-02-29 ENCOUNTER — HOSPITAL ENCOUNTER (OUTPATIENT)
Dept: MAMMOGRAPHY | Facility: HOSPITAL | Age: 80
Discharge: HOME | End: 2020-02-29
Attending: INTERNAL MEDICINE
Payer: MEDICARE

## 2020-02-29 DIAGNOSIS — Z12.31 ENCOUNTER FOR SCREENING MAMMOGRAM FOR MALIGNANT NEOPLASM OF BREAST: ICD-10-CM

## 2020-02-29 PROCEDURE — 77067 SCR MAMMO BI INCL CAD: CPT | Performed by: INTERNAL MEDICINE

## 2020-02-29 PROCEDURE — 77063 BREAST TOMOSYNTHESIS BI: CPT | Performed by: INTERNAL MEDICINE

## 2020-03-10 ENCOUNTER — HOSPITAL ENCOUNTER (OUTPATIENT)
Dept: ULTRASOUND IMAGING | Facility: HOSPITAL | Age: 80
Discharge: HOME | End: 2020-03-10
Attending: INTERNAL MEDICINE
Payer: MEDICARE

## 2020-03-10 ENCOUNTER — HOSPITAL ENCOUNTER (OUTPATIENT)
Dept: MAMMOGRAPHY | Facility: HOSPITAL | Age: 80
Discharge: HOME | End: 2020-03-10
Attending: INTERNAL MEDICINE
Payer: MEDICARE

## 2020-03-10 DIAGNOSIS — R92.8 ABNORMAL MAMMOGRAM: ICD-10-CM

## 2020-03-10 PROCEDURE — 77061 BREAST TOMOSYNTHESIS UNI: CPT | Performed by: INTERNAL MEDICINE

## 2020-03-10 PROCEDURE — 77065 DX MAMMO INCL CAD UNI: CPT | Performed by: INTERNAL MEDICINE

## 2020-03-10 PROCEDURE — 76642 ULTRASOUND BREAST LIMITED: CPT | Performed by: INTERNAL MEDICINE

## 2020-03-29 ENCOUNTER — TELEPHONE (OUTPATIENT)
Dept: INTERNAL MEDICINE CLINIC | Facility: CLINIC | Age: 80
End: 2020-03-29

## 2020-03-29 RX ORDER — FUROSEMIDE 20 MG/1
20 TABLET ORAL DAILY
Qty: 60 TABLET | Refills: 5 | Status: SHIPPED | OUTPATIENT
Start: 2020-03-29 | End: 2020-04-21 | Stop reason: ALTCHOICE

## 2020-03-29 NOTE — TELEPHONE ENCOUNTER
She phoned me about 8:30 AM.  She has been having swelling in both ankles and lower legs for the last several weeks. There is no pain in either leg. She had had edema in the past and was on diuretics but has not been on these for perhaps several years.

## 2020-04-01 NOTE — TELEPHONE ENCOUNTER
This is third day of taking water pills  First day no difference, second day pt took two pills, this is a dose she took in the past, this worked a bit, not a lot  As of today, swelling is a little bit better, pt still concerned   Please call to discuss/adv

## 2020-04-01 NOTE — TELEPHONE ENCOUNTER
LMOVM per HIPAA relaying Dr. Umer Martin message    Routed back to Dr. Joel Garcia as a reminder for phone visit tomorrow  To Minerva Lopez to advise on phone visit charges

## 2020-04-01 NOTE — TELEPHONE ENCOUNTER
Pt called; advise office exam; she is worried about coming to office, and she declines exam; denies angina or dyspnea; continue Lasix for now; will forward to Dr Lorena Justice

## 2020-04-01 NOTE — TELEPHONE ENCOUNTER
Spoke to patient. She took lasix 20 mg the first day, 40 mg yesterday, and 40 mg today. Dr Arabella Jenkins had prescribed 20 mg daily-see below. She states her swelling is \"a little bit better but not a lot\" She is concerned.   States the swelling is from her fo

## 2020-04-02 ENCOUNTER — TELEPHONE (OUTPATIENT)
Dept: INTERNAL MEDICINE CLINIC | Facility: CLINIC | Age: 80
End: 2020-04-02

## 2020-04-02 DIAGNOSIS — R22.43 LOCALIZED SWELLING OF BOTH LOWER LEGS: Primary | ICD-10-CM

## 2020-04-02 PROCEDURE — G2012 BRIEF CHECK IN BY MD/QHP: HCPCS | Performed by: INTERNAL MEDICINE

## 2020-04-02 RX ORDER — POTASSIUM CHLORIDE 600 MG/1
TABLET, FILM COATED, EXTENDED RELEASE ORAL
Qty: 20 TABLET | Refills: 3 | Status: SHIPPED | OUTPATIENT
Start: 2020-04-02 | End: 2020-04-21

## 2020-04-02 NOTE — TELEPHONE ENCOUNTER
I had a phone visit with Aida at about 3 PM on 4/2/2020. She relates that for the last several days she has noticed increasing swelling of both lower extremities from the mid calf down to the feet.   This is associated with a burning stinging sensati

## 2020-04-15 NOTE — TELEPHONE ENCOUNTER
Please advise - called patient who states her lasix was already increased to 40 mg daily which she is taking - to DR. FAIRBANKS

## 2020-04-15 NOTE — TELEPHONE ENCOUNTER
Increase Lasix to 2 tablets or 40 mg daily. Give triamcinolone cream 0.1% 60 g apply twice daily to areas of the skin of her legs that are itchy and burning.   Let us know how she is doing in a week or 2

## 2020-04-15 NOTE — TELEPHONE ENCOUNTER
Patient recently was started on a water pill. It is not helping at all. Both her legs burn & itch like crazy. They are also red from the ankle to the middle portion of her leg. She wants to know what to do.

## 2020-04-15 NOTE — TELEPHONE ENCOUNTER
Please advise - called patient who states when she gets up legs are fine . They start swelling up during the day , not as bad as they were, but the itching , burning and redness are the same - to DR. FAIRBANKS

## 2020-04-17 NOTE — TELEPHONE ENCOUNTER
On 4/15 I left a telephone encounter to give triamcinolone cream 0.1% 60 g applied twice daily to legs and to call us back in a week or so and let us know how she is doing. Continue Lasix 40 mg daily and potassium as she is taking.   If she is not improvin

## 2020-04-17 NOTE — TELEPHONE ENCOUNTER
Spoke with patient and relayed Dr. Oneil Anderson messages. She verbalized an understanding to all instructions- she will try cream and continue Lasix/K+. She will call back sooner if symptoms worsen otherwise she will update Dr. Jose Gaytan next week.  eRx sent to [facility].

## 2020-04-18 ENCOUNTER — MOBILE ENCOUNTER (OUTPATIENT)
Dept: INTERNAL MEDICINE CLINIC | Facility: CLINIC | Age: 80
End: 2020-04-18

## 2020-04-18 RX ORDER — DOXYCYCLINE HYCLATE 100 MG/1
100 CAPSULE ORAL 2 TIMES DAILY
Qty: 20 CAPSULE | Refills: 0 | Status: SHIPPED | OUTPATIENT
Start: 2020-04-18 | End: 2020-04-21

## 2020-04-21 ENCOUNTER — TELEPHONE (OUTPATIENT)
Dept: INTERNAL MEDICINE CLINIC | Facility: CLINIC | Age: 80
End: 2020-04-21

## 2020-04-21 ENCOUNTER — OFFICE VISIT (OUTPATIENT)
Dept: INTERNAL MEDICINE CLINIC | Facility: CLINIC | Age: 80
End: 2020-04-21
Payer: MEDICARE

## 2020-04-21 VITALS
BODY MASS INDEX: 34.4 KG/M2 | HEART RATE: 72 BPM | TEMPERATURE: 99 F | SYSTOLIC BLOOD PRESSURE: 152 MMHG | DIASTOLIC BLOOD PRESSURE: 74 MMHG | WEIGHT: 182.19 LBS | OXYGEN SATURATION: 96 % | HEIGHT: 61 IN

## 2020-04-21 DIAGNOSIS — I10 ESSENTIAL HYPERTENSION: ICD-10-CM

## 2020-04-21 DIAGNOSIS — E78.00 HYPERCHOLESTEREMIA: ICD-10-CM

## 2020-04-21 DIAGNOSIS — I87.2 VENOUS INSUFFICIENCY: ICD-10-CM

## 2020-04-21 DIAGNOSIS — R22.43 LOCALIZED SWELLING OF BOTH LOWER LEGS: Primary | ICD-10-CM

## 2020-04-21 PROCEDURE — G0463 HOSPITAL OUTPT CLINIC VISIT: HCPCS | Performed by: INTERNAL MEDICINE

## 2020-04-21 PROCEDURE — 99214 OFFICE O/P EST MOD 30 MIN: CPT | Performed by: INTERNAL MEDICINE

## 2020-04-21 RX ORDER — METOLAZONE 2.5 MG/1
2.5 TABLET ORAL DAILY
Qty: 60 TABLET | Refills: 3 | Status: SHIPPED | OUTPATIENT
Start: 2020-04-21 | End: 2020-05-06

## 2020-04-21 RX ORDER — POTASSIUM CHLORIDE 600 MG/1
TABLET, FILM COATED, EXTENDED RELEASE ORAL
Qty: 60 TABLET | Refills: 5 | Status: SHIPPED | OUTPATIENT
Start: 2020-04-21 | End: 2020-07-15

## 2020-04-21 RX ORDER — FUROSEMIDE 40 MG/1
40 TABLET ORAL 2 TIMES DAILY
Qty: 90 TABLET | Refills: 3 | Status: SHIPPED | OUTPATIENT
Start: 2020-04-21 | End: 2020-06-15

## 2020-04-21 NOTE — TELEPHONE ENCOUNTER
Pt called - Lab in [facility] is closed  Please call patient to advise where else she can go  [phone]

## 2020-04-21 NOTE — PROGRESS NOTES
I spoke with patient, on call on Saturday, 4/18/2020 at approximately 9 AM, describes lower extremities that are swollen and red, some itching, she did talk with Dr. Wilian Hager earlier in the week.   Discussed possible cellulitis, and sent in a set of antibiotic

## 2020-04-21 NOTE — TELEPHONE ENCOUNTER
Please tell her that I have changed my mind and I want to do some labs now and I have also ordered a blood test that may indicate whether or not she has a blood clot in her legs.   Salt may be if she could do this blood work in the next day or 2 and then ca

## 2020-04-21 NOTE — PROGRESS NOTES
Prasanna Palencia is a 78year old female. HPI:   She has had unexplained swelling of both lower legs, greater on the left. She has burning and itching of the anterior shins bilaterally. No pain. No chest pain or sob.  I increased Lasix from 20 to 40 mg d Oral Tab As needed for breathing (Patient taking differently: Take 2 mg by mouth daily.  ) 30 tablet 3   • aspirin 81 MG Oral Tab Take 81 mg by mouth daily.      • ergocalciferol 45749 units Oral Cap 66183 Units twice a week x 8 weeks 16 capsule 3   • triam and add zaroxolyn 2.5 mg daily. I advised bilateral venous doppler but she does not want to do due to virus. Venous doppler neg 11/19 but she did have DVT 5 yrs ago. I will order a d-dimer.     2. Venous insufficiency  Most likely clause of edema - she i

## 2020-04-21 NOTE — TELEPHONE ENCOUNTER
Patient called regarding Metolazone 2.5 mg- RN explained she should take it daily - probably in the morning or before noon since this is also a diuretic and she might have to go to bathroom more often - verbalized understanding

## 2020-04-21 NOTE — TELEPHONE ENCOUNTER
Patient is calling to verify when she is supposed to take new medication - Metolazone - that she picked up today

## 2020-04-21 NOTE — TELEPHONE ENCOUNTER
Please advise pt to call main number to ask which labs are open. Likely all outpatient labs are closed then and will need to go to the hospital lab, but she can call to ask.

## 2020-04-22 ENCOUNTER — TELEPHONE (OUTPATIENT)
Dept: INTERNAL MEDICINE CLINIC | Facility: CLINIC | Age: 80
End: 2020-04-22

## 2020-04-22 ENCOUNTER — LAB ENCOUNTER (OUTPATIENT)
Dept: LAB | Facility: HOSPITAL | Age: 80
End: 2020-04-22
Attending: INTERNAL MEDICINE
Payer: MEDICARE

## 2020-04-22 DIAGNOSIS — I10 ESSENTIAL HYPERTENSION: ICD-10-CM

## 2020-04-22 DIAGNOSIS — R22.43 LOCALIZED SWELLING OF BOTH LOWER LEGS: ICD-10-CM

## 2020-04-22 LAB
CHOLEST SERPL-MCNC: 185 MG/DL
HDLC SERPL-MCNC: 45 MG/DL
LDLC SERPL CALC-MCNC: 91 MG/DL
LENGTH OF FAST TIME PATIENT: NO H
NONHDLC SERPL-MCNC: 140 MG/DL
TRIGL SERPL-MCNC: 247 MG/DL
TSH SERPL-ACNC: 1.91 MCUNITS/ML
VLDLC SERPL CALC-MCNC: 49 MG/DL

## 2020-04-22 PROCEDURE — 36415 COLL VENOUS BLD VENIPUNCTURE: CPT

## 2020-04-22 PROCEDURE — 85379 FIBRIN DEGRADATION QUANT: CPT

## 2020-04-22 PROCEDURE — 80061 LIPID PANEL: CPT

## 2020-04-22 PROCEDURE — 85025 COMPLETE CBC W/AUTO DIFF WBC: CPT

## 2020-04-22 PROCEDURE — 80053 COMPREHEN METABOLIC PANEL: CPT

## 2020-04-22 PROCEDURE — 84443 ASSAY THYROID STIM HORMONE: CPT

## 2020-04-22 NOTE — TELEPHONE ENCOUNTER
Labs including CBC, CMP, lipids, thyroid all normal - continue same meds     Blood clot test is at upper limit of normal but is normal - this gives us 95 % certainty no blood clot.      How is she doing with the swelling??

## 2020-04-22 NOTE — TELEPHONE ENCOUNTER
Spoke to patient relayed MD message. Patient verbalized understanding and agrees with plan. Patient reports her swelling has improved today, she c/o some itchiness but reports the triamcinolone cream is helpful.

## 2020-04-27 ENCOUNTER — TELEPHONE (OUTPATIENT)
Dept: CARDIOLOGY | Age: 80
End: 2020-04-27

## 2020-04-27 NOTE — TELEPHONE ENCOUNTER
Patient called to let Dr Kristian Hoskins know her legs are a little bit better   But wants to see the cardiologist   Says her heart beats very fast for a few seconds then It stops & she feels tired    Patient is concerned &  Requests call back     [phone]

## 2020-04-27 NOTE — TELEPHONE ENCOUNTER
To Dr. Lane Martinez----    Patient reports heart palpitations for months now. Feels as if her heart is \"shaking/vibrating. \" She feels extremely fatigued after these episodes. States last episode was this morning. She feels normal now.       Denies chest pain

## 2020-04-27 NOTE — TELEPHONE ENCOUNTER
Has she seen a cardiologist in the past? If so see him. If not:    Refer to Tj Gong / Mitch Garcia. They are heart rhythm specialists. They are with [facility] and have offices in [place].     If any problems call me back.

## 2020-04-30 ENCOUNTER — TELEPHONE (OUTPATIENT)
Dept: INTERNAL MEDICINE CLINIC | Facility: CLINIC | Age: 80
End: 2020-04-30

## 2020-04-30 NOTE — TELEPHONE ENCOUNTER
About 4 days started to get pain in the back of her head/neck & shoulders  Has had it before but it is really bothering her again/really bad   Also has nausea  Wanted to speak with Dr Harry Flower   Requests call back from his nurse today   [phone]

## 2020-04-30 NOTE — TELEPHONE ENCOUNTER
To Dr. Aureliano Mcmahon - pt would prefer to wait for Dr. Fabian Later. OK to wait for Dr. Fabian Later?  see below  Onset: \"long time ago - once in a while it recurs\" - recent onset 4 days. When turning head from side to side it is \"squeeky, makes noise\". Pain Level: 6-7/10. Some relief with tylenol  Pt c/o nausea since starting 3 meds 4/21/20 - is taking with food which helps a little - bitter taste in mouth.     To Dr. Fabian Later

## 2020-04-30 NOTE — TELEPHONE ENCOUNTER
Discussed with patient. Patient states that she has had neck pain for a \"long time\" however this is been a little bit more for the last 4 days. She does not really think she did anything to strain her neck. She has pain in the back of her neck and back of her head. Hurts with movement. She took Tylenol 500 mg 2 tablets with just a little bit of relief. Heat caused increased pain and ice helped a little. She has some hand numbness when she sleeps on that side. No motor weakness or persistent numbness or tingling. She is wondering if she needs a \"MRI\". Patient has a history of hypertension. Also some renal insufficiency with GFR 39. Medication list reviewed. For now she agrees for me to forward message to [facility]. ( Charline Blake.  Maryamciera Lee )

## 2020-05-01 ENCOUNTER — TELEPHONE (OUTPATIENT)
Dept: INTERNAL MEDICINE CLINIC | Facility: CLINIC | Age: 80
End: 2020-05-01

## 2020-05-01 RX ORDER — METHYLPREDNISOLONE 4 MG/1
TABLET ORAL
Qty: 1 KIT | Refills: 0 | Status: SHIPPED | OUTPATIENT
Start: 2020-05-01 | End: 2020-06-15 | Stop reason: ALTCHOICE

## 2020-05-01 RX ORDER — ALPRAZOLAM 0.25 MG/1
0.25 TABLET ORAL EVERY 6 HOURS PRN
Qty: 30 TABLET | Refills: 1 | Status: SHIPPED | OUTPATIENT
Start: 2020-05-01 | End: 2021-04-26

## 2020-05-01 NOTE — TELEPHONE ENCOUNTER
I d/w Pinky Robertson - she has intolerance to zaroxolyn - nausea - I advised d/c. She has intermittent SOB    She has had miserable cervical pain over the  Last month - Medrol dosepak    She requests alprazolam for increasing anxiety. I did send in.      I wan

## 2020-05-06 ENCOUNTER — OFFICE VISIT (OUTPATIENT)
Dept: INTERNAL MEDICINE CLINIC | Facility: CLINIC | Age: 80
End: 2020-05-06
Payer: MEDICARE

## 2020-05-06 VITALS
TEMPERATURE: 99 F | DIASTOLIC BLOOD PRESSURE: 80 MMHG | WEIGHT: 182 LBS | HEART RATE: 55 BPM | OXYGEN SATURATION: 96 % | BODY MASS INDEX: 34 KG/M2 | SYSTOLIC BLOOD PRESSURE: 142 MMHG | RESPIRATION RATE: 14 BRPM

## 2020-05-06 DIAGNOSIS — R06.00 DYSPNEA ON EXERTION: ICD-10-CM

## 2020-05-06 DIAGNOSIS — M79.89 LEG SWELLING: ICD-10-CM

## 2020-05-06 DIAGNOSIS — M54.2 CERVICAL PAIN (NECK): Primary | ICD-10-CM

## 2020-05-06 PROCEDURE — 99214 OFFICE O/P EST MOD 30 MIN: CPT | Performed by: INTERNAL MEDICINE

## 2020-05-06 PROCEDURE — G0463 HOSPITAL OUTPT CLINIC VISIT: HCPCS | Performed by: INTERNAL MEDICINE

## 2020-05-06 NOTE — PROGRESS NOTES
Jeffery Fisher is a 78year old female. HPI:   She notes decreased swelling of the lower legs lukas the left. Her weight is about the same. She had an episode of dyspnea about 2 weeks ago. Lasted several hours.  She states this was more of a weaknes for Anxiety. 15 tablet 0   • PRAVASTATIN SODIUM 40 MG Oral Tab TAKE 1 TABLET BY MOUTH NIGHTLY.  90 tablet 3   • LOSARTAN POTASSIUM 50 MG Oral Tab TAKE 1 TABLET BY MOUTH EVERY DAY 90 tablet 3   • Albuterol Sulfate 2 MG Oral Tab As needed for breathing (Cristal Future  - US VENOUS DOPPLER LEG LEFT - DIAG IMG (CPT=93971); Future  - US VENOUS DOPPLER LEG RIGHT - DIAG IMG (D6807317); Future    2. Dyspnea on exertion    I have referred her to Dr Altaf Villatoro for evaluation.     - CARD ECHO 2D DOPPLER (CPT=93306);  Future  -

## 2020-05-14 ENCOUNTER — HOSPITAL ENCOUNTER (OUTPATIENT)
Dept: CV DIAGNOSTICS | Facility: HOSPITAL | Age: 80
Discharge: HOME | End: 2020-05-14
Attending: INTERNAL MEDICINE
Payer: MEDICARE

## 2020-05-14 ENCOUNTER — HOSPITAL ENCOUNTER (OUTPATIENT)
Dept: ULTRASOUND IMAGING | Facility: HOSPITAL | Age: 80
Discharge: HOME | End: 2020-05-14
Attending: INTERNAL MEDICINE
Payer: MEDICARE

## 2020-05-14 ENCOUNTER — HOSPITAL ENCOUNTER (OUTPATIENT)
Dept: GENERAL RADIOLOGY | Facility: HOSPITAL | Age: 80
Discharge: HOME | End: 2020-05-14
Attending: INTERNAL MEDICINE
Payer: MEDICARE

## 2020-05-14 DIAGNOSIS — M54.2 CERVICAL PAIN (NECK): ICD-10-CM

## 2020-05-14 DIAGNOSIS — R06.00 DYSPNEA ON EXERTION: ICD-10-CM

## 2020-05-14 DIAGNOSIS — M79.89 LEG SWELLING: ICD-10-CM

## 2020-05-14 PROCEDURE — 93306 TTE W/DOPPLER COMPLETE: CPT | Performed by: INTERNAL MEDICINE

## 2020-05-14 PROCEDURE — 72050 X-RAY EXAM NECK SPINE 4/5VWS: CPT | Performed by: INTERNAL MEDICINE

## 2020-05-14 PROCEDURE — 93970 EXTREMITY STUDY: CPT | Performed by: INTERNAL MEDICINE

## 2020-05-18 ENCOUNTER — TELEPHONE (OUTPATIENT)
Dept: INTERNAL MEDICINE CLINIC | Facility: CLINIC | Age: 80
End: 2020-05-18

## 2020-05-18 DIAGNOSIS — R06.00 DYSPNEA ON EXERTION: ICD-10-CM

## 2020-05-18 NOTE — TELEPHONE ENCOUNTER
I did discuss with Yazan Owens. I reviewed her echocardiogram with her and reported an excellent ejection fracture and and minor leakage of the mitral valve and sclerosis of the aortic valve.   X-rays of her cervical spine reveal osteoarthritis and she is imp

## 2020-05-18 NOTE — TELEPHONE ENCOUNTER
Pt calling again she like a call from DR. C ASAP stated she cant wait no more.  Like her results now. (per Pt)

## 2020-05-18 NOTE — TELEPHONE ENCOUNTER
Pt. Is calling to speak with a nurse no additional info provided ph.  # [phone]   Routed high to clinical

## 2020-05-18 NOTE — TELEPHONE ENCOUNTER
Patient looking for results of cardiac echo, Venous US legs, cervical spine xray done 5/14 Patient also asked that MD call her personally to discuss other matters.     To DR FAIRBANKS

## 2020-05-19 ENCOUNTER — HOSPITAL ENCOUNTER (OUTPATIENT)
Dept: GENERAL RADIOLOGY | Facility: HOSPITAL | Age: 80
Discharge: HOME | End: 2020-05-19
Attending: INTERNAL MEDICINE
Payer: MEDICARE

## 2020-05-19 DIAGNOSIS — R06.00 DYSPNEA ON EXERTION: ICD-10-CM

## 2020-05-19 PROCEDURE — 71046 X-RAY EXAM CHEST 2 VIEWS: CPT | Performed by: INTERNAL MEDICINE

## 2020-05-20 ENCOUNTER — TELEPHONE (OUTPATIENT)
Dept: INTERNAL MEDICINE CLINIC | Facility: CLINIC | Age: 80
End: 2020-05-20

## 2020-05-20 RX ORDER — ALBUTEROL 4 MG/1
TABLET ORAL
Qty: 30 TABLET | Refills: 0 | Status: SHIPPED | OUTPATIENT
Start: 2020-05-20 | End: 2020-12-21

## 2020-05-20 RX ORDER — ALBUTEROL SULFATE 4 MG/1
4 TABLET, FILM COATED, EXTENDED RELEASE ORAL EVERY 12 HOURS
Refills: 0 | Status: CANCELLED | OUTPATIENT
Start: 2020-05-20

## 2020-05-20 NOTE — TELEPHONE ENCOUNTER
CXR is good     I did send in Albuterol 4 mg - could you please d/c albuterol 2 mg in med module - for some reason I could not do.   Thanks

## 2020-05-20 NOTE — TELEPHONE ENCOUNTER
Patient calling for Cxr results from 5/19/2020. Need refill Albuterol. Asking for 4mg instead of the 2mg. States refill was to be called in yesterday. [address]     Best number to contact patient at [phone]

## 2020-05-20 NOTE — TELEPHONE ENCOUNTER
Please advise - patient wants x- ray results also albuterol 4mg instead of 2 mg - pending - to DR. FAIRBANKS

## 2020-05-20 NOTE — TELEPHONE ENCOUNTER
I spoke with patient and relayed Dr. Jennifer Lee message. She verbalized understanding. Medication list was updated in Epic. Invited patient to call back with any questions or concerns.

## 2020-05-26 ENCOUNTER — TELEPHONE (OUTPATIENT)
Dept: CARDIOLOGY | Age: 80
End: 2020-05-26

## 2020-06-01 ENCOUNTER — APPOINTMENT (OUTPATIENT)
Dept: CARDIOLOGY | Age: 80
End: 2020-06-01

## 2020-06-15 ENCOUNTER — LAB ENCOUNTER (OUTPATIENT)
Dept: LAB | Age: 80
End: 2020-06-15
Attending: INTERNAL MEDICINE
Payer: MEDICARE

## 2020-06-15 ENCOUNTER — OFFICE VISIT (OUTPATIENT)
Dept: INTERNAL MEDICINE CLINIC | Facility: CLINIC | Age: 80
End: 2020-06-15
Payer: MEDICARE

## 2020-06-15 VITALS
BODY MASS INDEX: 33 KG/M2 | OXYGEN SATURATION: 98 % | TEMPERATURE: 97 F | RESPIRATION RATE: 14 BRPM | WEIGHT: 175 LBS | HEART RATE: 87 BPM | SYSTOLIC BLOOD PRESSURE: 124 MMHG | DIASTOLIC BLOOD PRESSURE: 70 MMHG

## 2020-06-15 DIAGNOSIS — I10 ESSENTIAL HYPERTENSION: ICD-10-CM

## 2020-06-15 DIAGNOSIS — R22.43 LOCALIZED SWELLING OF BOTH LOWER LEGS: Primary | ICD-10-CM

## 2020-06-15 DIAGNOSIS — I87.2 VENOUS INSUFFICIENCY: ICD-10-CM

## 2020-06-15 LAB
ALBUMIN SERPL-MCNC: 3.6 G/DL
ALBUMIN/GLOB SERPL: 1 {RATIO}
ALP SERPL-CCNC: 70 U/L
ALT SERPL-CCNC: 17 UNITS/L
ANION GAP SERPL CALC-SCNC: 4 MMOL/L
AST SERPL-CCNC: 14 UNITS/L
BILIRUB SERPL-MCNC: 1.2 MG/DL
BUN SERPL-MCNC: 20 MG/DL
BUN/CREAT SERPL: 16.1
CALCIUM SERPL-MCNC: 9.2 MG/DL
CHLORIDE SERPL-SCNC: 104 MMOL/L
CO2 SERPL-SCNC: 32 MMOL/L
CREAT SERPL-MCNC: 1.24 MG/DL
GLOBULIN SER-MCNC: 3.7 G/DL
GLUCOSE SERPL-MCNC: 80 MG/DL
HCT VFR BLD CALC: 42.7 %
HGB BLD-MCNC: 14 G/DL
LENGTH OF FAST TIME PATIENT: NO H
MCH RBC QN AUTO: 30.8 PG
MCHC RBC AUTO-ENTMCNC: 32.8 G/DL
MCV RBC AUTO: 93.8 FL
PLATELET # BLD: 272 K/MCL
POTASSIUM SERPL-SCNC: 4.2 MMOL/L
PROT SERPL-MCNC: 7.3 G/DL
RBC # BLD: 4.55 10*6/UL
SODIUM SERPL-SCNC: 140 MMOL/L
WBC # BLD: 6.8 K/MCL

## 2020-06-15 PROCEDURE — 99214 OFFICE O/P EST MOD 30 MIN: CPT | Performed by: INTERNAL MEDICINE

## 2020-06-15 PROCEDURE — 85025 COMPLETE CBC W/AUTO DIFF WBC: CPT

## 2020-06-15 PROCEDURE — 36415 COLL VENOUS BLD VENIPUNCTURE: CPT

## 2020-06-15 PROCEDURE — 80053 COMPREHEN METABOLIC PANEL: CPT

## 2020-06-15 PROCEDURE — G0463 HOSPITAL OUTPT CLINIC VISIT: HCPCS | Performed by: INTERNAL MEDICINE

## 2020-06-15 RX ORDER — POTASSIUM CHLORIDE 1500 MG/1
20 TABLET, FILM COATED, EXTENDED RELEASE ORAL DAILY
Qty: 30 TABLET | Refills: 6 | Status: SHIPPED | OUTPATIENT
Start: 2020-06-15 | End: 2020-07-15 | Stop reason: ALTCHOICE

## 2020-06-15 RX ORDER — TORSEMIDE 20 MG/1
20 TABLET ORAL DAILY
Qty: 30 TABLET | Refills: 5 | Status: SHIPPED | OUTPATIENT
Start: 2020-06-15 | End: 2020-07-15 | Stop reason: ALTCHOICE

## 2020-06-15 NOTE — PROGRESS NOTES
Antionette Hodges is a 78year old female. HPI:   She continues to have swelling of her legs but no pain -  Legs normal in the morning. She is taking Lasix 60 mg daily.      Pain in the cervical region persists - xrays show OA    AYALA - - ECHO 5/20 - EF 65 Medical History:   Diagnosis Date   • Asthma    • Essential hypertension    • High blood pressure    • High cholesterol    • Hypercholesterolemia    • Phlebitis Summer 2015   • Right ankle injury 2013    CORTISONE INJECTION   • TIA (transient ischemic fabiola

## 2020-06-17 ENCOUNTER — TELEPHONE (OUTPATIENT)
Dept: CARDIOLOGY | Age: 80
End: 2020-06-17

## 2020-06-18 ENCOUNTER — TELEPHONE (OUTPATIENT)
Dept: INTERNAL MEDICINE CLINIC | Facility: CLINIC | Age: 80
End: 2020-06-18

## 2020-06-18 RX ORDER — TORSEMIDE 20 MG/1
20 TABLET ORAL 2 TIMES DAILY
Qty: 60 TABLET | Refills: 5 | OUTPATIENT
Start: 2020-06-18

## 2020-06-18 NOTE — TELEPHONE ENCOUNTER
To Dr. Angeline Niño - see below. Per 6/15/20 note pt is to increase torsemide 20mg to BID - see pended. Pt asking how late she has to take med - does not want to be up all night going to bathroom. She takes first dose 8 AM - does she have to wait until 8 PM or can she take it earlier? Also would like lab results.

## 2020-06-18 NOTE — TELEPHONE ENCOUNTER
Pt is taken Torsemide the direction are once daily. But she remembers  telling pt to take one in the morning and one in the afternoon. Like to know how to take them.  Also asking for her lab results

## 2020-06-19 NOTE — TELEPHONE ENCOUNTER
To   -patient would also like her lab results thanks  For RN calling patient  ( when calling back please see both messages )

## 2020-06-19 NOTE — TELEPHONE ENCOUNTER
I spoke with patient and relayed Dr. Elisa Giraldo message about how to take torsemide BID and her lab results. She verbalized understanding. Patient says she does not need a refill at this time. Dr. Marcus Nip, please advise if the refill should be sent with the new instructions.

## 2020-06-25 ENCOUNTER — TELEPHONE (OUTPATIENT)
Dept: INTERNAL MEDICINE CLINIC | Facility: CLINIC | Age: 80
End: 2020-06-25

## 2020-06-25 NOTE — TELEPHONE ENCOUNTER
I spoke with patient to let her know we received her message and Dr. Bre Bowser will be back tomorrow. She verbalized understanding.

## 2020-06-25 NOTE — TELEPHONE ENCOUNTER
Has an appt w/cardiologist Dr Khushboo Amaro    Monday 6/29 - 3pm    States Dr Khushboo Amaro needs to speak with Dr Bre Bowser prior to her appt    Pt requests call back from Dr Sharan Garcia nurse     [phone]

## 2020-06-26 RX ORDER — FUROSEMIDE 40 MG/1
40 TABLET ORAL 2 TIMES DAILY
COMMUNITY
Start: 2020-04-21 | End: 2020-06-29

## 2020-06-26 RX ORDER — METOLAZONE 2.5 MG/1
2.5 TABLET ORAL PRN
COMMUNITY
Start: 2020-04-21

## 2020-06-26 RX ORDER — PRAVASTATIN SODIUM 40 MG
40 TABLET ORAL NIGHTLY
COMMUNITY
Start: 2016-01-29

## 2020-06-26 RX ORDER — ALPRAZOLAM 0.25 MG/1
0.25 TABLET ORAL PRN
COMMUNITY
Start: 2020-05-01

## 2020-06-26 RX ORDER — LOSARTAN POTASSIUM 50 MG/1
50 TABLET ORAL DAILY
COMMUNITY
Start: 2019-06-24

## 2020-06-26 RX ORDER — AMLODIPINE BESYLATE 2.5 MG/1
2.5 TABLET ORAL DAILY
COMMUNITY

## 2020-06-26 RX ORDER — TORSEMIDE 20 MG/1
20 TABLET ORAL DAILY
COMMUNITY
Start: 2020-06-15

## 2020-06-26 RX ORDER — TRIAMCINOLONE ACETONIDE 1 MG/G
1 CREAM TOPICAL 2 TIMES DAILY
COMMUNITY
Start: 2020-04-17

## 2020-06-26 RX ORDER — POTASSIUM CHLORIDE 1500 MG/1
20 TABLET, EXTENDED RELEASE ORAL DAILY
COMMUNITY
Start: 2020-06-15

## 2020-06-26 RX ORDER — DOXYCYCLINE HYCLATE 100 MG/1
100 CAPSULE ORAL 2 TIMES DAILY
COMMUNITY
Start: 2020-04-18

## 2020-06-26 RX ORDER — DIAZEPAM 2 MG/1
2 TABLET ORAL PRN
COMMUNITY
Start: 2019-11-26 | End: 2020-06-29

## 2020-06-26 RX ORDER — MECLIZINE HYDROCHLORIDE 25 MG/1
25 TABLET ORAL PRN
COMMUNITY
Start: 2019-11-26

## 2020-06-26 RX ORDER — POTASSIUM CHLORIDE 600 MG/1
8 TABLET, FILM COATED, EXTENDED RELEASE ORAL DAILY
COMMUNITY
Start: 2020-04-21

## 2020-06-26 RX ORDER — METOPROLOL TARTRATE 100 MG/1
100 TABLET ORAL 2 TIMES DAILY
COMMUNITY
Start: 2020-02-03

## 2020-06-26 NOTE — TELEPHONE ENCOUNTER
LM on VM relaying MD message (OK per HIPAA). Instructed pt to call our office back with any questions.

## 2020-06-29 ENCOUNTER — OFFICE VISIT (OUTPATIENT)
Dept: CARDIOLOGY | Age: 80
End: 2020-06-29

## 2020-06-29 VITALS
HEART RATE: 56 BPM | BODY MASS INDEX: 33.23 KG/M2 | DIASTOLIC BLOOD PRESSURE: 70 MMHG | SYSTOLIC BLOOD PRESSURE: 130 MMHG | OXYGEN SATURATION: 96 % | WEIGHT: 176 LBS | HEIGHT: 61 IN

## 2020-06-29 DIAGNOSIS — E78.00 HYPERCHOLESTEROLEMIA: ICD-10-CM

## 2020-06-29 DIAGNOSIS — I10 ESSENTIAL HYPERTENSION: ICD-10-CM

## 2020-06-29 DIAGNOSIS — R06.09 DOE (DYSPNEA ON EXERTION): Primary | ICD-10-CM

## 2020-06-29 DIAGNOSIS — I89.0 LYMPHEDEMA: ICD-10-CM

## 2020-06-29 PROCEDURE — 99214 OFFICE O/P EST MOD 30 MIN: CPT | Performed by: INTERNAL MEDICINE

## 2020-06-29 SDOH — HEALTH STABILITY: MENTAL HEALTH: HOW OFTEN DO YOU HAVE A DRINK CONTAINING ALCOHOL?: NEVER

## 2020-06-29 ASSESSMENT — PATIENT HEALTH QUESTIONNAIRE - PHQ9
SUM OF ALL RESPONSES TO PHQ9 QUESTIONS 1 AND 2: 0
2. FEELING DOWN, DEPRESSED OR HOPELESS: NOT AT ALL
CLINICAL INTERPRETATION OF PHQ9 SCORE: NO FURTHER SCREENING NEEDED
1. LITTLE INTEREST OR PLEASURE IN DOING THINGS: NOT AT ALL
CLINICAL INTERPRETATION OF PHQ2 SCORE: NO FURTHER SCREENING NEEDED
SUM OF ALL RESPONSES TO PHQ9 QUESTIONS 1 AND 2: 0

## 2020-07-15 ENCOUNTER — OFFICE VISIT (OUTPATIENT)
Dept: INTERNAL MEDICINE CLINIC | Facility: CLINIC | Age: 80
End: 2020-07-15
Payer: MEDICARE

## 2020-07-15 VITALS
HEART RATE: 61 BPM | SYSTOLIC BLOOD PRESSURE: 122 MMHG | BODY MASS INDEX: 33.99 KG/M2 | DIASTOLIC BLOOD PRESSURE: 82 MMHG | TEMPERATURE: 99 F | OXYGEN SATURATION: 93 % | WEIGHT: 180 LBS | HEIGHT: 61 IN

## 2020-07-15 DIAGNOSIS — E78.00 HYPERCHOLESTEREMIA: ICD-10-CM

## 2020-07-15 DIAGNOSIS — I10 ESSENTIAL HYPERTENSION: ICD-10-CM

## 2020-07-15 DIAGNOSIS — R22.43 LOCALIZED SWELLING OF BOTH LOWER LEGS: Primary | ICD-10-CM

## 2020-07-15 PROCEDURE — G0463 HOSPITAL OUTPT CLINIC VISIT: HCPCS | Performed by: INTERNAL MEDICINE

## 2020-07-15 PROCEDURE — 99214 OFFICE O/P EST MOD 30 MIN: CPT | Performed by: INTERNAL MEDICINE

## 2020-07-15 RX ORDER — SPIRONOLACTONE 25 MG/1
25 TABLET ORAL DAILY
Qty: 60 TABLET | Refills: 3 | Status: SHIPPED | OUTPATIENT
Start: 2020-07-15 | End: 2020-07-20 | Stop reason: ALTCHOICE

## 2020-07-15 NOTE — PROGRESS NOTES
Ag Caballero is a 78year old female. HPI:   Main complaint is continued is dyspnea on exertion. She saw Dr Kacey Kern and nuclear treadmill is planned. Echo was fine. She notes wheezing and thinks albuterol 4 mg tabs helps.  She says she had asthma in the Oral Tab Take 81 mg by mouth daily.      • ergocalciferol 12188 units Oral Cap 13575 Units twice a week x 8 weeks 16 capsule 3      Past Medical History:   Diagnosis Date   • Asthma    • Essential hypertension    • High blood pressure    • High cholesterol

## 2020-07-20 ENCOUNTER — OFFICE VISIT (OUTPATIENT)
Dept: INTERNAL MEDICINE CLINIC | Facility: CLINIC | Age: 80
End: 2020-07-20
Payer: MEDICARE

## 2020-07-20 VITALS
TEMPERATURE: 97 F | HEIGHT: 61 IN | OXYGEN SATURATION: 95 % | BODY MASS INDEX: 34.21 KG/M2 | DIASTOLIC BLOOD PRESSURE: 82 MMHG | SYSTOLIC BLOOD PRESSURE: 140 MMHG | WEIGHT: 181.19 LBS | HEART RATE: 62 BPM

## 2020-07-20 DIAGNOSIS — I10 ESSENTIAL HYPERTENSION: ICD-10-CM

## 2020-07-20 DIAGNOSIS — M79.89 SWELLING OF LIMB: Primary | ICD-10-CM

## 2020-07-20 DIAGNOSIS — R22.43 LOCALIZED SWELLING OF BOTH LOWER LEGS: ICD-10-CM

## 2020-07-20 PROCEDURE — G0463 HOSPITAL OUTPT CLINIC VISIT: HCPCS | Performed by: INTERNAL MEDICINE

## 2020-07-20 PROCEDURE — 99214 OFFICE O/P EST MOD 30 MIN: CPT | Performed by: INTERNAL MEDICINE

## 2020-07-20 RX ORDER — BUMETANIDE 1 MG/1
1 TABLET ORAL 2 TIMES DAILY
Qty: 60 TABLET | Refills: 5 | Status: SHIPPED | OUTPATIENT
Start: 2020-07-20

## 2020-07-20 NOTE — PROGRESS NOTES
Ranjan Norwood is a 78year old female. HPI:   Edema both lower legs unchanged, she had some clear liquid draining from the medial right ankle yesterday. No definite etiology for edema;  she feels well generally. No dyspnea.         SR: no chest damion • Hypercholesterolemia    • Phlebitis Summer 2015   • Right ankle injury 2013    CORTISONE INJECTION   • Swelling of limb 6/29/2013    Log Date: 06/21/2013    • TIA (transient ischemic attack)       Social History:  Social History    Tobacco Use      Sm

## 2020-07-23 ENCOUNTER — ANCILLARY PROCEDURE (OUTPATIENT)
Dept: CARDIOLOGY | Age: 80
End: 2020-07-23
Attending: INTERNAL MEDICINE

## 2020-07-23 ENCOUNTER — TELEPHONE (OUTPATIENT)
Dept: INTERNAL MEDICINE CLINIC | Facility: CLINIC | Age: 80
End: 2020-07-23

## 2020-07-23 VITALS — WEIGHT: 176 LBS | HEIGHT: 61 IN | BODY MASS INDEX: 33.23 KG/M2

## 2020-07-23 DIAGNOSIS — R06.09 DOE (DYSPNEA ON EXERTION): ICD-10-CM

## 2020-07-23 PROCEDURE — 93018 CV STRESS TEST I&R ONLY: CPT | Performed by: INTERNAL MEDICINE

## 2020-07-23 PROCEDURE — 93017 CV STRESS TEST TRACING ONLY: CPT | Performed by: INTERNAL MEDICINE

## 2020-07-23 PROCEDURE — 78452 HT MUSCLE IMAGE SPECT MULT: CPT | Performed by: INTERNAL MEDICINE

## 2020-07-23 PROCEDURE — 93016 CV STRESS TEST SUPVJ ONLY: CPT | Performed by: INTERNAL MEDICINE

## 2020-07-23 PROCEDURE — A9502 TC99M TETROFOSMIN: HCPCS | Performed by: INTERNAL MEDICINE

## 2020-07-23 RX ORDER — REGADENOSON 0.08 MG/ML
0.4 INJECTION, SOLUTION INTRAVENOUS ONCE
Status: COMPLETED | OUTPATIENT
Start: 2020-07-23 | End: 2020-07-23

## 2020-07-23 RX ADMIN — REGADENOSON 0.4 MG: 0.08 INJECTION, SOLUTION INTRAVENOUS at 13:35

## 2020-07-23 ASSESSMENT — EXERCISE STRESS TEST
PEAK_RPP: 11256
PEAK_BP: 120/60
STAGE_CATEGORIES: RECOVERY 3
PEAK_RPP: 12600
STAGE_CATEGORIES: RECOVERY 0
PEAK_HR: 84
PEAK_BP: 134/80
PEAK_RPP: 11088
STRESS_SYMPTOMS: DYSPNEA
COMMENTS: 4 MINUTE RECOVERY; SYMPTOMS RESOLVING
PEAK_HR: 68
PEAK_BP: 124/62
PEAK_RPP: 9960
STAGE_CATEGORIES: RESTING
STAGE_CATEGORIES: 1
COMMENTS: 8 MINUTE RECOVERY
COMMENTS: 30 SECOND RECOVERY
STOPPAGE_REASON: PROTOCOL COMPLETE
COMMENTS: INJECTED AT :30
STRESS_SYMPTOMS: DYSPNEA
PEAK_BP: 132/70
PEAK_RPP: 10416
STAGE_CATEGORIES: RECOVERY 2
COMMENTS: 2 MINUTE RECOVERY
PEAK_BP: 140/72
STAGE_CATEGORIES: RECOVERY 1
PEAK_HR: 83
PEAK_HR: 90

## 2020-07-23 NOTE — TELEPHONE ENCOUNTER
Patient is calling for the name of an Eye Dr, she needs to have her cataracts removed    Please call patient [phone]  She is aware Dr Latrice Augustine is out of the office ok to wait

## 2020-07-24 ENCOUNTER — APPOINTMENT (OUTPATIENT)
Dept: CARDIOLOGY | Age: 80
End: 2020-07-24

## 2020-07-24 LAB
LV EF: 73 %
STRESS POST EXERCISE DUR SEC: 30 SEC
STRESS TARGET HR: 138 BPM

## 2020-07-24 NOTE — TELEPHONE ENCOUNTER
Pt called to check status on message  Also had stress test yesterday & would like to know the results    Please call pt on Monday [phone]

## 2020-07-28 ENCOUNTER — HOSPITAL ENCOUNTER (OUTPATIENT)
Dept: CT IMAGING | Facility: HOSPITAL | Age: 80
Discharge: HOME | End: 2020-07-28
Attending: INTERNAL MEDICINE
Payer: MEDICARE

## 2020-07-28 DIAGNOSIS — R22.43 LOCALIZED SWELLING OF BOTH LOWER LEGS: ICD-10-CM

## 2020-07-28 PROCEDURE — 74176 CT ABD & PELVIS W/O CONTRAST: CPT | Performed by: INTERNAL MEDICINE

## 2020-07-28 NOTE — TELEPHONE ENCOUNTER
Tell her CT of the abdomen and pelvis looks good no masses or any abnormal findings to explain swelling in her legs.   She does have a hernia around the umbilicus but if no pain there then nothing to worry about

## 2020-07-28 NOTE — TELEPHONE ENCOUNTER
To Dr. Hermelinda Corrigan - your message relayed to pt, understanding verbalized, Dr. Keyon Choi phone number given to pt. Pt would like results of today's CT scan.

## 2020-07-30 ENCOUNTER — TELEPHONE (OUTPATIENT)
Dept: INTERNAL MEDICINE CLINIC | Facility: CLINIC | Age: 80
End: 2020-07-30

## 2020-08-03 ENCOUNTER — OFFICE VISIT (OUTPATIENT)
Dept: INTERNAL MEDICINE CLINIC | Facility: CLINIC | Age: 80
End: 2020-08-03
Payer: MEDICARE

## 2020-08-03 VITALS
HEIGHT: 61 IN | BODY MASS INDEX: 33.61 KG/M2 | SYSTOLIC BLOOD PRESSURE: 138 MMHG | OXYGEN SATURATION: 96 % | DIASTOLIC BLOOD PRESSURE: 76 MMHG | WEIGHT: 178 LBS | HEART RATE: 60 BPM | TEMPERATURE: 98 F

## 2020-08-03 DIAGNOSIS — R22.43 LOCALIZED SWELLING OF BOTH LOWER LEGS: Primary | ICD-10-CM

## 2020-08-03 DIAGNOSIS — E78.00 HYPERCHOLESTEREMIA: ICD-10-CM

## 2020-08-03 DIAGNOSIS — I10 ESSENTIAL HYPERTENSION: ICD-10-CM

## 2020-08-03 PROCEDURE — G0463 HOSPITAL OUTPT CLINIC VISIT: HCPCS | Performed by: INTERNAL MEDICINE

## 2020-08-03 PROCEDURE — 99214 OFFICE O/P EST MOD 30 MIN: CPT | Performed by: INTERNAL MEDICINE

## 2020-08-03 RX ORDER — FUROSEMIDE 40 MG/1
40 TABLET ORAL AS NEEDED
COMMUNITY
Start: 2020-07-19

## 2020-08-03 RX ORDER — MECLIZINE HYDROCHLORIDE 25 MG/1
25 TABLET ORAL 3 TIMES DAILY PRN
Qty: 30 TABLET | Refills: 0 | Status: SHIPPED | OUTPATIENT
Start: 2020-08-03

## 2020-08-03 RX ORDER — POTASSIUM CHLORIDE 1500 MG/1
20 TABLET, FILM COATED, EXTENDED RELEASE ORAL AS NEEDED
COMMUNITY
Start: 2020-06-15

## 2020-08-03 RX ORDER — AMLODIPINE BESYLATE 2.5 MG/1
2.5 TABLET ORAL DAILY
COMMUNITY
End: 2020-09-17

## 2020-08-03 NOTE — PROGRESS NOTES
Prosper Maldonado is a 78year old female. HPI:   F/u peripheral edema and she has had a slow diuresis and weight down 3 lbs with Bumex 1 mg bid.  No cardiac etiology as echo and nucl stress test both normal.  CT abo/pelvis - no abnormalities to account f ergocalciferol 42755 units Oral Cap 10612 Units twice a week x 8 weeks 16 capsule 3   • furosemide 40 MG Oral Tab      • ALPRAZolam 0.25 MG Oral Tab Take 1 tablet (0.25 mg total) by mouth every 6 (six) hours as needed.  (Patient not taking: Reported on 8/3/

## 2020-08-17 ENCOUNTER — TELEPHONE (OUTPATIENT)
Dept: INTERNAL MEDICINE CLINIC | Facility: CLINIC | Age: 80
End: 2020-08-17

## 2020-08-17 NOTE — TELEPHONE ENCOUNTER
Patient calling to speak with Dr Ramos Reusing regarding an appt she has tomorrow morning with eye doctor - Dr Fabienne Barry    If she decides not to see Dr Fabienne Barry she needs to cancel appt this morning     Please call patient [phone]

## 2020-08-17 NOTE — TELEPHONE ENCOUNTER
Spoke to patient to try and gather more information- patient was very upset and demanded writer hand the phone to Dr. Jsoe Hahn. Patient would not give any information as to what message she needed to relay. Writer explained that message will get relayed immediately and the doctor will get back to her asap.

## 2020-09-14 ENCOUNTER — OFFICE VISIT (OUTPATIENT)
Dept: INTERNAL MEDICINE CLINIC | Facility: CLINIC | Age: 80
End: 2020-09-14
Payer: MEDICARE

## 2020-09-14 ENCOUNTER — LAB ENCOUNTER (OUTPATIENT)
Dept: LAB | Age: 80
End: 2020-09-14
Attending: INTERNAL MEDICINE
Payer: MEDICARE

## 2020-09-14 ENCOUNTER — LAB REQUISITION (OUTPATIENT)
Dept: LAB | Facility: HOSPITAL | Age: 80
End: 2020-09-14
Payer: MEDICARE

## 2020-09-14 VITALS
SYSTOLIC BLOOD PRESSURE: 150 MMHG | OXYGEN SATURATION: 95 % | BODY MASS INDEX: 33.61 KG/M2 | WEIGHT: 178 LBS | TEMPERATURE: 97 F | DIASTOLIC BLOOD PRESSURE: 80 MMHG | HEART RATE: 65 BPM | HEIGHT: 61 IN

## 2020-09-14 DIAGNOSIS — E78.00 HYPERCHOLESTEREMIA: Primary | ICD-10-CM

## 2020-09-14 DIAGNOSIS — R09.89 BILATERAL CAROTID BRUITS: ICD-10-CM

## 2020-09-14 DIAGNOSIS — E78.00 HYPERCHOLESTEREMIA: ICD-10-CM

## 2020-09-14 DIAGNOSIS — R22.43 LOCALIZED SWELLING OF BOTH LOWER LEGS: ICD-10-CM

## 2020-09-14 DIAGNOSIS — I10 ESSENTIAL HYPERTENSION: ICD-10-CM

## 2020-09-14 DIAGNOSIS — Z20.828 CONTACT WITH AND (SUSPECTED) EXPOSURE TO OTHER VIRAL COMMUNICABLE DISEASES: ICD-10-CM

## 2020-09-14 LAB
ALBUMIN SERPL-MCNC: 3.5 G/DL (ref 3.4–5)
ALBUMIN/GLOB SERPL: 0.9 {RATIO} (ref 1–2)
ALP LIVER SERPL-CCNC: 76 U/L (ref 55–142)
ALT SERPL-CCNC: 17 U/L (ref 13–56)
ANION GAP SERPL CALC-SCNC: 6 MMOL/L (ref 0–18)
AST SERPL-CCNC: 16 U/L (ref 15–37)
BACTERIA UR QL AUTO: NEGATIVE /HPF
BACTERIA UR QL AUTO: NEGATIVE /HPF
BASOPHILS # BLD AUTO: 0.06 X10(3) UL (ref 0–0.2)
BASOPHILS NFR BLD AUTO: 0.9 %
BILIRUB SERPL-MCNC: 1.2 MG/DL (ref 0.1–2)
BILIRUB UR QL: NEGATIVE
BUN BLD-MCNC: 16 MG/DL (ref 7–18)
BUN/CREAT SERPL: 13.1 (ref 10–20)
CALCIUM BLD-MCNC: 9.3 MG/DL (ref 8.5–10.1)
CHLORIDE SERPL-SCNC: 108 MMOL/L (ref 98–112)
CHOLEST SMN-MCNC: 174 MG/DL (ref ?–200)
CLARITY UR: CLEAR
CO2 SERPL-SCNC: 28 MMOL/L (ref 21–32)
COLOR UR: YELLOW
CREAT BLD-MCNC: 1.22 MG/DL (ref 0.55–1.02)
DEPRECATED RDW RBC AUTO: 45.5 FL (ref 35.1–46.3)
EOSINOPHIL # BLD AUTO: 0.6 X10(3) UL (ref 0–0.7)
EOSINOPHIL NFR BLD AUTO: 8.7 %
ERYTHROCYTE [DISTWIDTH] IN BLOOD BY AUTOMATED COUNT: 13.3 % (ref 11–15)
GLOBULIN PLAS-MCNC: 3.7 G/DL (ref 2.8–4.4)
GLUCOSE BLD-MCNC: 90 MG/DL (ref 70–99)
GLUCOSE UR-MCNC: NEGATIVE MG/DL
HCT VFR BLD AUTO: 43.8 % (ref 35–48)
HDLC SERPL-MCNC: 43 MG/DL (ref 40–59)
HGB BLD-MCNC: 14.3 G/DL (ref 12–16)
HGB UR QL STRIP.AUTO: NEGATIVE
IMM GRANULOCYTES # BLD AUTO: 0.02 X10(3) UL (ref 0–1)
IMM GRANULOCYTES NFR BLD: 0.3 %
KETONES UR-MCNC: NEGATIVE MG/DL
LDLC SERPL CALC-MCNC: 77 MG/DL (ref ?–100)
LEUKOCYTE ESTERASE UR QL STRIP.AUTO: NEGATIVE
LYMPHOCYTES # BLD AUTO: 2 X10(3) UL (ref 1–4)
LYMPHOCYTES NFR BLD AUTO: 29.1 %
M PROTEIN MFR SERPL ELPH: 7.2 G/DL (ref 6.4–8.2)
MCH RBC QN AUTO: 30.4 PG (ref 26–34)
MCHC RBC AUTO-ENTMCNC: 32.6 G/DL (ref 31–37)
MCV RBC AUTO: 93 FL (ref 80–100)
MONOCYTES # BLD AUTO: 0.7 X10(3) UL (ref 0.1–1)
MONOCYTES NFR BLD AUTO: 10.2 %
NEUTROPHILS # BLD AUTO: 3.49 X10 (3) UL (ref 1.5–7.7)
NEUTROPHILS # BLD AUTO: 3.49 X10(3) UL (ref 1.5–7.7)
NEUTROPHILS NFR BLD AUTO: 50.8 %
NITRITE UR QL STRIP.AUTO: NEGATIVE
NONHDLC SERPL-MCNC: 131 MG/DL (ref ?–130)
OSMOLALITY SERPL CALC.SUM OF ELEC: 295 MOSM/KG (ref 275–295)
PATIENT FASTING Y/N/NP: YES
PATIENT FASTING Y/N/NP: YES
PH UR: 6 [PH] (ref 5–8)
PLATELET # BLD AUTO: 214 10(3)UL (ref 150–450)
POTASSIUM SERPL-SCNC: 4.5 MMOL/L (ref 3.5–5.1)
PROT UR-MCNC: NEGATIVE MG/DL
RBC # BLD AUTO: 4.71 X10(6)UL (ref 3.8–5.3)
RBC #/AREA URNS AUTO: 1 /HPF
RBC #/AREA URNS AUTO: <1 /HPF
SODIUM SERPL-SCNC: 142 MMOL/L (ref 136–145)
SP GR UR STRIP: 1.02 (ref 1–1.03)
TRIGL SERPL-MCNC: 272 MG/DL (ref 30–149)
TSI SER-ACNC: 1.88 MIU/ML (ref 0.36–3.74)
UROBILINOGEN UR STRIP-ACNC: <2
VLDLC SERPL CALC-MCNC: 54 MG/DL (ref 0–30)
WBC # BLD AUTO: 6.9 X10(3) UL (ref 4–11)
WBC #/AREA URNS AUTO: 1 /HPF
WBC #/AREA URNS AUTO: 2 /HPF

## 2020-09-14 PROCEDURE — 90732 PPSV23 VACC 2 YRS+ SUBQ/IM: CPT | Performed by: INTERNAL MEDICINE

## 2020-09-14 PROCEDURE — 36415 COLL VENOUS BLD VENIPUNCTURE: CPT

## 2020-09-14 PROCEDURE — G0463 HOSPITAL OUTPT CLINIC VISIT: HCPCS | Performed by: INTERNAL MEDICINE

## 2020-09-14 PROCEDURE — 84443 ASSAY THYROID STIM HORMONE: CPT

## 2020-09-14 PROCEDURE — 99214 OFFICE O/P EST MOD 30 MIN: CPT | Performed by: INTERNAL MEDICINE

## 2020-09-14 PROCEDURE — 81001 URINALYSIS AUTO W/SCOPE: CPT

## 2020-09-14 PROCEDURE — 81015 MICROSCOPIC EXAM OF URINE: CPT

## 2020-09-14 PROCEDURE — 85025 COMPLETE CBC W/AUTO DIFF WBC: CPT

## 2020-09-14 PROCEDURE — 80053 COMPREHEN METABOLIC PANEL: CPT

## 2020-09-14 PROCEDURE — 80061 LIPID PANEL: CPT

## 2020-09-14 PROCEDURE — G0009 ADMIN PNEUMOCOCCAL VACCINE: HCPCS | Performed by: INTERNAL MEDICINE

## 2020-09-14 RX ORDER — PREDNISOLONE ACETATE 10 MG/ML
SUSPENSION/ DROPS OPHTHALMIC
COMMUNITY
Start: 2020-09-10 | End: 2020-09-14 | Stop reason: ALTCHOICE

## 2020-09-14 RX ORDER — OFLOXACIN 3 MG/ML
SOLUTION/ DROPS OPHTHALMIC
COMMUNITY
Start: 2020-09-10 | End: 2020-09-14 | Stop reason: ALTCHOICE

## 2020-09-14 RX ORDER — SPIRONOLACTONE 25 MG/1
TABLET ORAL
COMMUNITY
Start: 2020-09-11 | End: 2020-09-14 | Stop reason: ALTCHOICE

## 2020-09-14 NOTE — PROGRESS NOTES
Jeffery Fisher is a 78year old female. HPI:   She will be having cataract surgery this Thurs. She has had low back pain for 3 weeks, no fall or injury, improved with Tylenol. No radiculopathy.      Edema both lower legs the same - she is not sure w • Potassium Chloride ER 20 MEQ Oral Tab CR Take 20 mEq by mouth daily.         Past Medical History:   Diagnosis Date   • Asthma    • Essential hypertension    • High blood pressure    • High cholesterol    • Hypercholesterolemia    • Phlebitis Summer 201 hypertension  See above     The patient indicates understanding of these issues and agrees to the plan. The patient is asked to return in 1 month .

## 2020-09-16 ENCOUNTER — TELEPHONE (OUTPATIENT)
Dept: INTERNAL MEDICINE CLINIC | Facility: CLINIC | Age: 80
End: 2020-09-16

## 2020-09-16 LAB — SARS-COV-2 RNA RESP QL NAA+PROBE: NOT DETECTED

## 2020-09-16 NOTE — TELEPHONE ENCOUNTER
Please call pt  She would like results of labs taken on Monday  Pt was also told by Dr Harvinder Barry to stop a medication  Pt doesn't take the medication he wrote down   Please call to advise  Tasked to nursing

## 2020-09-16 NOTE — TELEPHONE ENCOUNTER
To Dr Fabian Later to please advise on patient's lab results from 9/14    Please also let patient know:  9/14 office visit note: D/c amlodipine, increase losartan to 100 mg daily

## 2020-09-16 NOTE — TELEPHONE ENCOUNTER
Labs including CBC, CMP, lipids, thyroid all normal - continue same meds      points high -  Decrease conc sweets and excessive carbs in diet.  She does not need meds for this

## 2020-09-17 RX ORDER — LOSARTAN POTASSIUM 100 MG/1
100 TABLET ORAL DAILY
Qty: 90 TABLET | Refills: 3 | Status: SHIPPED | OUTPATIENT
Start: 2020-09-17 | End: 2021-08-06

## 2020-09-17 NOTE — TELEPHONE ENCOUNTER
S/w patient and relayed MDs message-verbalized understanding. I reviewed message below with patient re: 9/16 ov note regarding BP regimen instructions. Patient states that she was not taking Amlodipine, she does not have RX at home. She repeated back instructions to increase Losartan to 100mg daily (take 2-50mg tablets). Pt requested refill. Script sent for Losartan 100mg daily. To Dr BOYKIN: I removed Amlodipine from medication profile.

## 2020-09-18 ENCOUNTER — TELEPHONE (OUTPATIENT)
Dept: INTERNAL MEDICINE CLINIC | Facility: CLINIC | Age: 80
End: 2020-09-18

## 2020-10-12 RX ORDER — PRAVASTATIN SODIUM 40 MG
TABLET ORAL
Qty: 90 TABLET | Refills: 3 | Status: SHIPPED | OUTPATIENT
Start: 2020-10-12 | End: 2021-10-05

## 2020-10-14 ENCOUNTER — TELEPHONE (OUTPATIENT)
Dept: INTERNAL MEDICINE CLINIC | Facility: CLINIC | Age: 80
End: 2020-10-14

## 2020-10-14 NOTE — TELEPHONE ENCOUNTER
Please call her to reschedule her visit which she missed today.   Please do not enter any charges for today's missed visit

## 2020-10-19 ENCOUNTER — LAB ENCOUNTER (OUTPATIENT)
Dept: LAB | Age: 80
End: 2020-10-19
Attending: INTERNAL MEDICINE
Payer: MEDICARE

## 2020-10-19 ENCOUNTER — OFFICE VISIT (OUTPATIENT)
Dept: INTERNAL MEDICINE CLINIC | Facility: CLINIC | Age: 80
End: 2020-10-19
Payer: MEDICARE

## 2020-10-19 VITALS
SYSTOLIC BLOOD PRESSURE: 144 MMHG | OXYGEN SATURATION: 95 % | WEIGHT: 176.38 LBS | HEIGHT: 61 IN | DIASTOLIC BLOOD PRESSURE: 76 MMHG | HEART RATE: 61 BPM | BODY MASS INDEX: 33.3 KG/M2 | TEMPERATURE: 98 F | RESPIRATION RATE: 16 BRPM

## 2020-10-19 DIAGNOSIS — I10 ESSENTIAL HYPERTENSION: ICD-10-CM

## 2020-10-19 DIAGNOSIS — I10 ESSENTIAL HYPERTENSION: Primary | ICD-10-CM

## 2020-10-19 PROCEDURE — 99214 OFFICE O/P EST MOD 30 MIN: CPT | Performed by: INTERNAL MEDICINE

## 2020-10-19 PROCEDURE — G0463 HOSPITAL OUTPT CLINIC VISIT: HCPCS | Performed by: INTERNAL MEDICINE

## 2020-10-19 PROCEDURE — 80048 BASIC METABOLIC PNL TOTAL CA: CPT

## 2020-10-19 PROCEDURE — 36415 COLL VENOUS BLD VENIPUNCTURE: CPT

## 2020-10-19 NOTE — PROGRESS NOTES
Jhonny Manrique is a 78year old female. HPI:   She had right cataract surgery - she notes some decrease in visual acuity from the time of surgery - she has been seeing Dr Suni Lemus.      Pain in left knee and LS spine continue - she wants to see orthopedic mouth daily. • Potassium Chloride ER 20 MEQ Oral Tab CR Take 20 mEq by mouth daily.         Past Medical History:   Diagnosis Date   • Asthma    • Essential hypertension    • High blood pressure    • High cholesterol    • Hypercholesterolemia    • Phleb

## 2020-10-21 ENCOUNTER — TELEPHONE (OUTPATIENT)
Dept: INTERNAL MEDICINE CLINIC | Facility: CLINIC | Age: 80
End: 2020-10-21

## 2020-11-23 ENCOUNTER — TELEPHONE (OUTPATIENT)
Dept: INTERNAL MEDICINE CLINIC | Facility: CLINIC | Age: 80
End: 2020-11-23

## 2020-11-23 NOTE — TELEPHONE ENCOUNTER
Patient has a very bad pain in her hip  Started all of a sudden yesterday morning  She Cannot walk  Requests to speak with Dr Lanie Bess    Call back# [phone]

## 2020-11-23 NOTE — TELEPHONE ENCOUNTER
Spoke to patient who reports she developed sudden pain to her L hip. She doesn't remember any trauma to the area, falls, or feeling like something popped/moved funny. Yesterday she could not really walk. Today she is limping and using the sofa to help her walk and feels a little better. Pain is worse when walking or moving. Pain is sharp and can get up to a 10/10. She has been taking tylenol which has not provided much relief. Offered to schedule patient for an OV but patient states it would be hard to get someone to drive her here. TO Dr. Bethanie Rich to please advise, thanks!

## 2020-11-23 NOTE — TELEPHONE ENCOUNTER
I did discuss with Anastasia Agosto -yesterday, she arose from a chair and had sudden and severe pain in the left hip and has had great difficulty ambulating since that time. She did not fall or have any injury. I advised that she go to the emergency room for evaluation and x-rays and she declines. I then offered to see her here and she will think about it and see if she can get a ride or if she is able to drive here by herself.

## 2020-11-30 ENCOUNTER — LAB REQUISITION (OUTPATIENT)
Dept: LAB | Facility: HOSPITAL | Age: 80
End: 2020-11-30
Payer: MEDICARE

## 2020-11-30 DIAGNOSIS — Z01.818 ENCOUNTER FOR OTHER PREPROCEDURAL EXAMINATION: ICD-10-CM

## 2020-12-21 ENCOUNTER — OFFICE VISIT (OUTPATIENT)
Dept: INTERNAL MEDICINE CLINIC | Facility: CLINIC | Age: 80
End: 2020-12-21
Payer: MEDICARE

## 2020-12-21 VITALS
OXYGEN SATURATION: 95 % | HEART RATE: 73 BPM | SYSTOLIC BLOOD PRESSURE: 140 MMHG | DIASTOLIC BLOOD PRESSURE: 72 MMHG | HEIGHT: 61 IN | TEMPERATURE: 98 F | BODY MASS INDEX: 33.79 KG/M2 | WEIGHT: 179 LBS

## 2020-12-21 DIAGNOSIS — I10 ESSENTIAL HYPERTENSION: ICD-10-CM

## 2020-12-21 DIAGNOSIS — D09.9 CARCINOMA IN SITU IN A POLYP: ICD-10-CM

## 2020-12-21 DIAGNOSIS — Z00.00 ENCOUNTER FOR MEDICARE ANNUAL WELLNESS EXAM: Primary | ICD-10-CM

## 2020-12-21 DIAGNOSIS — R22.43 LOCALIZED SWELLING OF BOTH LOWER LEGS: ICD-10-CM

## 2020-12-21 DIAGNOSIS — E13.9 DIABETES 1.5, MANAGED AS TYPE 2 (HCC): ICD-10-CM

## 2020-12-21 DIAGNOSIS — E78.00 HYPERCHOLESTEREMIA: ICD-10-CM

## 2020-12-21 DIAGNOSIS — R42 LIGHTHEADEDNESS: ICD-10-CM

## 2020-12-21 PROCEDURE — G0463 HOSPITAL OUTPT CLINIC VISIT: HCPCS | Performed by: INTERNAL MEDICINE

## 2020-12-21 PROCEDURE — 99214 OFFICE O/P EST MOD 30 MIN: CPT | Performed by: INTERNAL MEDICINE

## 2020-12-21 PROCEDURE — G0439 PPPS, SUBSEQ VISIT: HCPCS | Performed by: INTERNAL MEDICINE

## 2020-12-21 RX ORDER — ALBUTEROL 4 MG/1
TABLET ORAL
Qty: 30 TABLET | Refills: 11 | Status: SHIPPED | OUTPATIENT
Start: 2020-12-21 | End: 2021-05-10

## 2020-12-21 NOTE — PROGRESS NOTES
Durwood Nageotte is a 78year old female.   HPI:   She is here for annual [facility] wellness exam.     She is feeling reasonably well other than chronic \"tightness of skin in the back of the  Head\" which is very painful and is constant and has been present for aspirin 81 MG Oral Tab Take 81 mg by mouth daily. • Potassium Chloride ER 20 MEQ Oral Tab CR Take 20 mEq by mouth daily. • Meclizine HCl 25 MG Oral Tab Take 1 tablet (25 mg total) by mouth 3 (three) times daily as needed.  For dizziness (Patient not your appetite been poor?: No    Type of Diet: Low Carb    How does the patient maintain a good energy level?: Daily Walks    How would you describe your daily physical activity?: Moderate    How would you describe your current health state?: Fair are recommended by your physician but may not be covered, or covered at this frequency, by your insurer. Please check with your insurance carrier before scheduling to verify coverage.    PREVENTATIVE SERVICES  INDICATIONS AND SCHEDULE Internal Lab or Proced Orders placed or performed in visit on 12/01/14   • PNEUMOCOCCAL VACC, 13 ALLAN IM    Update Immunization Activity if applicable    Hepatitis B No orders found for this or any previous visit.  Update Immunization Activity if applicable    Tetanus No orders overdue as she declined to have this repeat study done. The large transverse colon polyp was interpreted as markedly dysplastic or carcinoma in situ.   I have emphasized to her how important it is to have this testing done especially considering her family

## 2021-02-01 DIAGNOSIS — Z23 NEED FOR VACCINATION: ICD-10-CM

## 2021-02-16 RX ORDER — SPIRONOLACTONE 25 MG/1
25 TABLET ORAL DAILY
Qty: 90 TABLET | Refills: 2 | OUTPATIENT
Start: 2021-02-16 | End: 2022-02-11

## 2021-02-16 NOTE — TELEPHONE ENCOUNTER
Spoke to pt - she does not need at this time    Refill request has failed the Ambulatory Medication Refill Standing Order and is routed to the primary physician to review the following:    Requested Prescriptions     Refused Prescriptions Disp Refills   •

## 2021-03-16 ENCOUNTER — HOSPITAL ENCOUNTER (OUTPATIENT)
Dept: ULTRASOUND IMAGING | Facility: HOSPITAL | Age: 81
Discharge: HOME | End: 2021-03-16
Attending: INTERNAL MEDICINE
Payer: MEDICARE

## 2021-03-16 DIAGNOSIS — M79.89 LEG SWELLING: ICD-10-CM

## 2021-03-16 DIAGNOSIS — E78.00 HYPERCHOLESTEREMIA: ICD-10-CM

## 2021-03-16 DIAGNOSIS — R06.00 DYSPNEA ON EXERTION: ICD-10-CM

## 2021-03-16 DIAGNOSIS — R09.89 BILATERAL CAROTID BRUITS: ICD-10-CM

## 2021-03-16 PROCEDURE — 93971 EXTREMITY STUDY: CPT | Performed by: INTERNAL MEDICINE

## 2021-03-16 PROCEDURE — 93880 EXTRACRANIAL BILAT STUDY: CPT | Performed by: INTERNAL MEDICINE

## 2021-03-17 ENCOUNTER — TELEPHONE (OUTPATIENT)
Dept: INTERNAL MEDICINE CLINIC | Facility: CLINIC | Age: 81
End: 2021-03-17

## 2021-03-17 NOTE — TELEPHONE ENCOUNTER
Please call pt with results of tests taken yesterday  Pt would like to discuss  She is also scheduled for vaccine tomorrow  Tasked to nursing

## 2021-03-17 NOTE — TELEPHONE ENCOUNTER
Pt stated she got labs done at [place] [id number] yesterday advised her that we still have not received the lab results yet. Her concern is not knowing her lab results and getting the vaccine anyways without knowing if there is something wrong.  Advised patient per D

## 2021-03-17 NOTE — TELEPHONE ENCOUNTER
Tell her there is no indication in the system that she had labs drawn in the [facility] system.   Is she sure she had them in the [facility] system and if so which location and what is the address    Regardless of any lab abnormalities it is critical that she g

## 2021-03-17 NOTE — TELEPHONE ENCOUNTER
Where did she get her labs done? I do not see anything in the system. If she had them done outside of the system it may be several days before I have them back.     Tell her definitely she should get Covid vaccine

## 2021-03-17 NOTE — TELEPHONE ENCOUNTER
Venous Doppler of the left leg looks normal, no signs of blood clot    Ultrasound of the carotids is perfectly normal

## 2021-03-17 NOTE — TELEPHONE ENCOUNTER
Spoke to patient and relayed MD message, pt verbalized understanding, no further questions or concerns.

## 2021-03-17 NOTE — TELEPHONE ENCOUNTER
after speaking with patient and clarifying with patient she stated it wasn't labs that she did she is waiting for her imaging results done yesterday. Wants to know that they are okay.  Please advise

## 2021-03-22 ENCOUNTER — LAB ENCOUNTER (OUTPATIENT)
Dept: LAB | Age: 81
End: 2021-03-22
Attending: INTERNAL MEDICINE
Payer: MEDICARE

## 2021-03-22 ENCOUNTER — OFFICE VISIT (OUTPATIENT)
Dept: INTERNAL MEDICINE CLINIC | Facility: CLINIC | Age: 81
End: 2021-03-22
Payer: MEDICARE

## 2021-03-22 VITALS
WEIGHT: 178 LBS | DIASTOLIC BLOOD PRESSURE: 80 MMHG | SYSTOLIC BLOOD PRESSURE: 136 MMHG | HEIGHT: 61 IN | HEART RATE: 76 BPM | BODY MASS INDEX: 33.61 KG/M2 | TEMPERATURE: 98 F | OXYGEN SATURATION: 94 %

## 2021-03-22 DIAGNOSIS — E13.9 DIABETES 1.5, MANAGED AS TYPE 2 (HCC): ICD-10-CM

## 2021-03-22 DIAGNOSIS — E78.00 HYPERCHOLESTEREMIA: ICD-10-CM

## 2021-03-22 DIAGNOSIS — E55.9 VITAMIN D DEFICIENCY: ICD-10-CM

## 2021-03-22 DIAGNOSIS — I10 ESSENTIAL HYPERTENSION: ICD-10-CM

## 2021-03-22 DIAGNOSIS — I10 ESSENTIAL HYPERTENSION: Primary | ICD-10-CM

## 2021-03-22 DIAGNOSIS — D09.9 CARCINOMA IN SITU IN A POLYP: ICD-10-CM

## 2021-03-22 LAB
ALBUMIN SERPL-MCNC: 3.9 G/DL (ref 3.4–5)
ALBUMIN/GLOB SERPL: 1.2 {RATIO} (ref 1–2)
ALP LIVER SERPL-CCNC: 84 U/L
ALT SERPL-CCNC: 24 U/L
ANION GAP SERPL CALC-SCNC: 6 MMOL/L (ref 0–18)
AST SERPL-CCNC: 15 U/L (ref 15–37)
BASOPHILS # BLD AUTO: 0.06 X10(3) UL (ref 0–0.2)
BASOPHILS NFR BLD AUTO: 1.1 %
BILIRUB SERPL-MCNC: 1.5 MG/DL (ref 0.1–2)
BILIRUB UR QL: NEGATIVE
BUN BLD-MCNC: 20 MG/DL (ref 7–18)
BUN/CREAT SERPL: 17.9 (ref 10–20)
CALCIUM BLD-MCNC: 9.7 MG/DL (ref 8.5–10.1)
CHLORIDE SERPL-SCNC: 110 MMOL/L (ref 98–112)
CHOLEST SMN-MCNC: 180 MG/DL (ref ?–200)
CLARITY UR: CLEAR
CO2 SERPL-SCNC: 27 MMOL/L (ref 21–32)
COLOR UR: YELLOW
CREAT BLD-MCNC: 1.12 MG/DL
DEPRECATED RDW RBC AUTO: 45.5 FL (ref 35.1–46.3)
EOSINOPHIL # BLD AUTO: 0.44 X10(3) UL (ref 0–0.7)
EOSINOPHIL NFR BLD AUTO: 7.8 %
ERYTHROCYTE [DISTWIDTH] IN BLOOD BY AUTOMATED COUNT: 13.2 % (ref 11–15)
EST. AVERAGE GLUCOSE BLD GHB EST-MCNC: 120 MG/DL (ref 68–126)
GLOBULIN PLAS-MCNC: 3.3 G/DL (ref 2.8–4.4)
GLUCOSE BLD-MCNC: 83 MG/DL (ref 70–99)
GLUCOSE UR-MCNC: NEGATIVE MG/DL
HBA1C MFR BLD HPLC: 5.8 % (ref ?–5.7)
HCT VFR BLD AUTO: 45.3 %
HDLC SERPL-MCNC: 45 MG/DL (ref 40–59)
HGB BLD-MCNC: 14.5 G/DL
HGB UR QL STRIP.AUTO: NEGATIVE
IMM GRANULOCYTES # BLD AUTO: 0.01 X10(3) UL (ref 0–1)
IMM GRANULOCYTES NFR BLD: 0.2 %
KETONES UR-MCNC: NEGATIVE MG/DL
LDLC SERPL CALC-MCNC: 88 MG/DL (ref ?–100)
LYMPHOCYTES # BLD AUTO: 2.14 X10(3) UL (ref 1–4)
LYMPHOCYTES NFR BLD AUTO: 37.9 %
M PROTEIN MFR SERPL ELPH: 7.2 G/DL (ref 6.4–8.2)
MCH RBC QN AUTO: 30.1 PG (ref 26–34)
MCHC RBC AUTO-ENTMCNC: 32 G/DL (ref 31–37)
MCV RBC AUTO: 94 FL
MONOCYTES # BLD AUTO: 0.48 X10(3) UL (ref 0.1–1)
MONOCYTES NFR BLD AUTO: 8.5 %
NEUTROPHILS # BLD AUTO: 2.52 X10 (3) UL (ref 1.5–7.7)
NEUTROPHILS # BLD AUTO: 2.52 X10(3) UL (ref 1.5–7.7)
NEUTROPHILS NFR BLD AUTO: 44.5 %
NITRITE UR QL STRIP.AUTO: NEGATIVE
NONHDLC SERPL-MCNC: 135 MG/DL (ref ?–130)
OSMOLALITY SERPL CALC.SUM OF ELEC: 298 MOSM/KG (ref 275–295)
PATIENT FASTING Y/N/NP: YES
PATIENT FASTING Y/N/NP: YES
PH UR: 5 [PH] (ref 5–8)
PLATELET # BLD AUTO: 206 10(3)UL (ref 150–450)
POTASSIUM SERPL-SCNC: 4.7 MMOL/L (ref 3.5–5.1)
PROT UR-MCNC: NEGATIVE MG/DL
RBC # BLD AUTO: 4.82 X10(6)UL
SODIUM SERPL-SCNC: 143 MMOL/L (ref 136–145)
SP GR UR STRIP: 1.02 (ref 1–1.03)
TRIGL SERPL-MCNC: 236 MG/DL (ref 30–149)
TSI SER-ACNC: 1.98 MIU/ML (ref 0.36–3.74)
UROBILINOGEN UR STRIP-ACNC: <2
VLDLC SERPL CALC-MCNC: 47 MG/DL (ref 0–30)
WBC # BLD AUTO: 5.7 X10(3) UL (ref 4–11)

## 2021-03-22 PROCEDURE — 84443 ASSAY THYROID STIM HORMONE: CPT

## 2021-03-22 PROCEDURE — 82306 VITAMIN D 25 HYDROXY: CPT

## 2021-03-22 PROCEDURE — 80053 COMPREHEN METABOLIC PANEL: CPT

## 2021-03-22 PROCEDURE — 85025 COMPLETE CBC W/AUTO DIFF WBC: CPT

## 2021-03-22 PROCEDURE — 99214 OFFICE O/P EST MOD 30 MIN: CPT | Performed by: INTERNAL MEDICINE

## 2021-03-22 PROCEDURE — 81001 URINALYSIS AUTO W/SCOPE: CPT | Performed by: INTERNAL MEDICINE

## 2021-03-22 PROCEDURE — 36415 COLL VENOUS BLD VENIPUNCTURE: CPT

## 2021-03-22 PROCEDURE — 80061 LIPID PANEL: CPT

## 2021-03-22 PROCEDURE — 83036 HEMOGLOBIN GLYCOSYLATED A1C: CPT

## 2021-03-22 NOTE — PROGRESS NOTES
Silvia Pope is a 78year old female. HPI:   She had a normal venous doppler LLE    Normal carotid US recently    She has dyspnea with bending over and with walking - excessive weight. -  She needs to loose about 20 lbs.   Nuclear stress test neg 201 MG Oral Tab Take 1 tablet (2 mg total) by mouth 3 (three) times daily as needed for Anxiety. 15 tablet 0   • aspirin 81 MG Oral Tab Take 81 mg by mouth daily.         Past Medical History:   Diagnosis Date   • Asthma    • Essential hypertension    • High bl The patient indicates understanding of these issues and agrees to the plan. The patient is asked to return in 3 months.

## 2021-03-24 ENCOUNTER — TELEPHONE (OUTPATIENT)
Dept: INTERNAL MEDICINE CLINIC | Facility: CLINIC | Age: 81
End: 2021-03-24

## 2021-03-24 LAB — 25(OH)D3 SERPL-MCNC: 11.5 NG/ML (ref 30–100)

## 2021-03-24 RX ORDER — ERGOCALCIFEROL (VITAMIN D2) 1250 MCG
50000 CAPSULE ORAL WEEKLY
Qty: 12 CAPSULE | Refills: 3 | Status: SHIPPED | OUTPATIENT
Start: 2021-03-24 | End: 2021-04-23

## 2021-03-24 NOTE — TELEPHONE ENCOUNTER
Spoke to pt and advised on MD message below; pt verbalized understanding.     RX sent per MD written to verified pharmacy

## 2021-03-24 NOTE — TELEPHONE ENCOUNTER
Labs including CBC, CMP, lipids, thyroid, urine all normal - continue same meds     Vit D very low - start Vit D 50,000 us one per WEEK, # 12, REFILL X 3    TG a little high but no concern

## 2021-03-29 NOTE — TELEPHONE ENCOUNTER
Chief Complaint   Patient presents with     Follow Up     Pt c/o SOB and Dizziness     Vitals were taken and medications were reconciled. EKG was performed    Di ARREAGA  10:18 AM     Spoke to pt regarding labs per MD; pt states she is still taking her pravastatin 40 mg qday. Advised pt regarding 6 month repeat labs; pt verbalized understanding.      Verified home address with pt and advised she will be receiving AHA diet in the mail; pt

## 2021-04-21 ENCOUNTER — HOSPITAL ENCOUNTER (OUTPATIENT)
Dept: CT IMAGING | Facility: HOSPITAL | Age: 81
Discharge: HOME | End: 2021-04-21
Attending: INTERNAL MEDICINE
Payer: MEDICARE

## 2021-04-21 ENCOUNTER — TELEPHONE (OUTPATIENT)
Dept: INTERNAL MEDICINE CLINIC | Facility: CLINIC | Age: 81
End: 2021-04-21

## 2021-04-21 ENCOUNTER — HOSPITAL ENCOUNTER (EMERGENCY)
Facility: HOSPITAL | Age: 81
Discharge: HOME | End: 2021-04-21
Attending: EMERGENCY MEDICINE
Payer: MEDICARE

## 2021-04-21 ENCOUNTER — OFFICE VISIT (OUTPATIENT)
Dept: INTERNAL MEDICINE CLINIC | Facility: CLINIC | Age: 81
End: 2021-04-21
Payer: MEDICARE

## 2021-04-21 VITALS
TEMPERATURE: 99 F | DIASTOLIC BLOOD PRESSURE: 82 MMHG | WEIGHT: 180.63 LBS | BODY MASS INDEX: 34.1 KG/M2 | HEART RATE: 73 BPM | HEIGHT: 61 IN | OXYGEN SATURATION: 95 % | SYSTOLIC BLOOD PRESSURE: 152 MMHG

## 2021-04-21 VITALS
RESPIRATION RATE: 18 BRPM | HEIGHT: 61 IN | SYSTOLIC BLOOD PRESSURE: 175 MMHG | TEMPERATURE: 99 F | HEART RATE: 79 BPM | DIASTOLIC BLOOD PRESSURE: 88 MMHG | WEIGHT: 180 LBS | BODY MASS INDEX: 33.99 KG/M2 | OXYGEN SATURATION: 94 %

## 2021-04-21 DIAGNOSIS — K63.89 EPIPLOIC APPENDAGITIS: Primary | ICD-10-CM

## 2021-04-21 DIAGNOSIS — R10.32 LEFT LOWER QUADRANT ABDOMINAL PAIN: ICD-10-CM

## 2021-04-21 DIAGNOSIS — I10 ESSENTIAL HYPERTENSION: ICD-10-CM

## 2021-04-21 DIAGNOSIS — R10.32 LEFT LOWER QUADRANT ABDOMINAL PAIN: Primary | ICD-10-CM

## 2021-04-21 DIAGNOSIS — R91.1 PULMONARY NODULE: ICD-10-CM

## 2021-04-21 PROCEDURE — 74177 CT ABD & PELVIS W/CONTRAST: CPT | Performed by: INTERNAL MEDICINE

## 2021-04-21 PROCEDURE — 99214 OFFICE O/P EST MOD 30 MIN: CPT | Performed by: INTERNAL MEDICINE

## 2021-04-21 PROCEDURE — 99282 EMERGENCY DEPT VISIT SF MDM: CPT

## 2021-04-21 RX ORDER — LEVOFLOXACIN 500 MG/1
500 TABLET, FILM COATED ORAL DAILY
Qty: 10 TABLET | Refills: 0 | Status: SHIPPED | OUTPATIENT
Start: 2021-04-21 | End: 2021-05-01

## 2021-04-21 RX ORDER — METRONIDAZOLE 500 MG/1
500 TABLET ORAL 3 TIMES DAILY
Qty: 30 TABLET | Refills: 0 | Status: SHIPPED | OUTPATIENT
Start: 2021-04-21 | End: 2021-08-24 | Stop reason: ALTCHOICE

## 2021-04-21 NOTE — TELEPHONE ENCOUNTER
Patient is calling with left sided pain. Patient states her pain started this morning but is worse now. Patient states the pain is mostly on the left side of her stomach/below the belt. Patient states it hurts to touch the area or move at all.  Patient stat

## 2021-04-21 NOTE — TELEPHONE ENCOUNTER
Please advise - called patient who states she has left sided pain, when touching or moving around its 9/10 , when sitting 2/10 . Patient declines to go to ER - patient has gotten worse since this morning - to DR. FAIRBANKS

## 2021-04-22 ENCOUNTER — TELEPHONE (OUTPATIENT)
Dept: INTERNAL MEDICINE CLINIC | Facility: CLINIC | Age: 81
End: 2021-04-22

## 2021-04-22 NOTE — TELEPHONE ENCOUNTER
Please add patient to my schedule for this Monday at [facility]. Thanks

## 2021-04-22 NOTE — ED PROVIDER NOTES
Patient Seen in: [facility] Emergency Department    History   Patient presents with:  Abnormal Labs    Stated Complaint: LLQ Pain/ Abn CT    HPI    Patient complains of l lower  abdominal pain that began couple days ago.   Pain described as sharp, Take 1 tablet (25 mg total) by mouth 3 (three) times daily as needed. For dizziness  Patient not taking: Reported on 4/6/2021    bumetanide (BUMEX) 1 MG Oral Tab,  Take 1 tablet (1 mg total) by mouth 2 (two) times daily.  For swelling of legs  Patient not t not hypoxic and normal for the patient as interpreted by me.           Physical Exam  General Appearance: alert, no distress  Eyes: pupils equal and round no pallor or injection  ENT, Mouth: mucous membranes moist  Respiratory: there are no retractions, maxime

## 2021-04-22 NOTE — TELEPHONE ENCOUNTER
Per [address] ER report:  Clinical Impression:  Epiploic appendagitis  (primary encounter diagnosis)  Pulmonary nodule    Patient has questions about new diagnosis of Epiploic Appendagitis. Does anything need to be done?  Patient was told by the ER physician that

## 2021-04-22 NOTE — TELEPHONE ENCOUNTER
Pt saw Dr Arabella Jenkins yesterday & was sent to the ER  Called to speak with Dr Arabella Jenkins today     Requests call back to advise on what she should be doing ?      [phone]

## 2021-04-22 NOTE — ED QUICK NOTES
Patient cleared for discharge by MD. Patient verbalizes understanding of discharge instructions. Patient ambulatory with a steady gait upon discharge.

## 2021-04-22 NOTE — PROGRESS NOTES
Jeffery Fisher is a 78year old female.   HPI:   She was feeling fine all day of the day prior to her examination here until late in the evening when she moved or turned and had sudden pain in the left lower quadrant which continued to worsen and was pr Oral Tab Take 81 mg by mouth daily. • furosemide 40 MG Oral Tab Take 40 mg by mouth daily. (Patient not taking: Reported on 4/6/2021 )     • Potassium Chloride ER 20 MEQ Oral Tab CR Take 20 mEq by mouth daily.  (Patient not taking: Reported on 4/6/202 m)   Wt 180 lb 9.6 oz (81.9 kg)   SpO2 95%   BMI 34.12 kg/m²   GENERAL: well developed, well nourished,in no apparent distress  SKIN: no rashes,no suspicious lesions  HEENT: atraumatic, normocephalic,ears and throat are clear  NECK: supple,no adenopathy,no

## 2021-04-22 NOTE — ED INITIAL ASSESSMENT (HPI)
Pt sent from outpatient CT for abnormal results. Pt c/o LLQ pain since last night. Pt denies n/v/d. Pt notes hx of cholecystectomy.   Pt states that the pain only bothers her when she moves

## 2021-04-26 ENCOUNTER — OFFICE VISIT (OUTPATIENT)
Dept: INTERNAL MEDICINE CLINIC | Facility: CLINIC | Age: 81
End: 2021-04-26
Payer: MEDICARE

## 2021-04-26 VITALS
OXYGEN SATURATION: 94 % | WEIGHT: 179 LBS | SYSTOLIC BLOOD PRESSURE: 140 MMHG | DIASTOLIC BLOOD PRESSURE: 78 MMHG | HEIGHT: 61 IN | TEMPERATURE: 99 F | HEART RATE: 56 BPM | BODY MASS INDEX: 33.79 KG/M2

## 2021-04-26 DIAGNOSIS — R10.13 EPIGASTRIC PAIN: Primary | ICD-10-CM

## 2021-04-26 DIAGNOSIS — I10 ESSENTIAL HYPERTENSION: ICD-10-CM

## 2021-04-26 PROCEDURE — 99214 OFFICE O/P EST MOD 30 MIN: CPT | Performed by: INTERNAL MEDICINE

## 2021-04-26 NOTE — PROGRESS NOTES
Silvia Pope is a 78year old female. HPI:   I sent her to ED on 4/21 - I thought she had acute diverticulitis clinically but CT showed epiploic appendagitis Lt colon.  She has slowly improved over the WE, eating normally and today had zero abdominal MEQ Oral Tab CR Take 20 mEq by mouth daily. (Patient not taking: Reported on 4/6/2021 )     • Meclizine HCl 25 MG Oral Tab Take 1 tablet (25 mg total) by mouth 3 (three) times daily as needed.  For dizziness (Patient not taking: Reported on 4/6/2021 ) 30 ta edema    ASSESSMENT AND PLAN:   1. Abdominal  Pain -     Due to epiploic appendagitis which has resolved     No further treatment or instigation. 2. Essential hypertension  At goal, same meds     3.  Pulmonary nodules seen on recent CT - will repeat in

## 2021-05-13 RX ORDER — METOPROLOL TARTRATE 100 MG/1
100 TABLET ORAL 2 TIMES DAILY
Qty: 180 TABLET | Refills: 3 | Status: SHIPPED | OUTPATIENT
Start: 2021-05-13

## 2021-08-06 RX ORDER — LOSARTAN POTASSIUM 100 MG/1
TABLET ORAL
Qty: 90 TABLET | Refills: 2 | Status: SHIPPED | OUTPATIENT
Start: 2021-08-06

## 2021-08-19 ENCOUNTER — TELEPHONE (OUTPATIENT)
Dept: INTERNAL MEDICINE CLINIC | Facility: CLINIC | Age: 81
End: 2021-08-19

## 2021-08-19 NOTE — TELEPHONE ENCOUNTER
Please call pt, she is hoping to be added to Dr Harry Flower schedule on Monday  She has pain on the side of her head,  burning, pt hoping to see Dr Harry Flower on Monday   Pt understood Dr Harry Flower out of the office until Monday  Pt leaving for trip on 8/27/21  Please call pt to discuss pain  Tasked to nursing

## 2021-08-23 NOTE — TELEPHONE ENCOUNTER
As FYI to DR. FAIRBANKS and  called patient who states she is feeling better . Her headache comes and goes , rates it a # 6 . Has glynn tomorrow with MAGDI OVIEDO  She will  her records from hospital , she will be leaving for [facility]

## 2021-08-24 ENCOUNTER — OFFICE VISIT (OUTPATIENT)
Dept: INTERNAL MEDICINE CLINIC | Facility: CLINIC | Age: 81
End: 2021-08-24
Payer: MEDICARE

## 2021-08-24 ENCOUNTER — LAB ENCOUNTER (OUTPATIENT)
Dept: LAB | Age: 81
End: 2021-08-24
Attending: INTERNAL MEDICINE
Payer: MEDICARE

## 2021-08-24 VITALS
SYSTOLIC BLOOD PRESSURE: 150 MMHG | TEMPERATURE: 98 F | HEART RATE: 64 BPM | BODY MASS INDEX: 31.47 KG/M2 | WEIGHT: 171 LBS | HEIGHT: 62 IN | OXYGEN SATURATION: 97 % | DIASTOLIC BLOOD PRESSURE: 80 MMHG

## 2021-08-24 DIAGNOSIS — I80.9 PHLEBITIS: Primary | ICD-10-CM

## 2021-08-24 DIAGNOSIS — D09.9 CARCINOMA IN SITU IN A POLYP: ICD-10-CM

## 2021-08-24 DIAGNOSIS — E13.9 DIABETES 1.5, MANAGED AS TYPE 2 (HCC): ICD-10-CM

## 2021-08-24 DIAGNOSIS — E55.9 VITAMIN D DEFICIENCY: ICD-10-CM

## 2021-08-24 DIAGNOSIS — E78.00 HYPERCHOLESTEREMIA: ICD-10-CM

## 2021-08-24 DIAGNOSIS — I10 PRIMARY HYPERTENSION: ICD-10-CM

## 2021-08-24 LAB
ALBUMIN SERPL-MCNC: 4 G/DL (ref 3.4–5)
ALBUMIN/GLOB SERPL: 1.3 {RATIO} (ref 1–2)
ALP LIVER SERPL-CCNC: 61 U/L
ALT SERPL-CCNC: 20 U/L
ANION GAP SERPL CALC-SCNC: 4 MMOL/L (ref 0–18)
AST SERPL-CCNC: 16 U/L (ref 15–37)
BASOPHILS # BLD AUTO: 0.06 X10(3) UL (ref 0–0.2)
BASOPHILS NFR BLD AUTO: 1 %
BILIRUB SERPL-MCNC: 1.2 MG/DL (ref 0.1–2)
BUN BLD-MCNC: 21 MG/DL (ref 7–18)
BUN/CREAT SERPL: 17.8 (ref 10–20)
CALCIUM BLD-MCNC: 9.3 MG/DL (ref 8.5–10.1)
CHLORIDE SERPL-SCNC: 113 MMOL/L (ref 98–112)
CHOLEST SMN-MCNC: 181 MG/DL (ref ?–200)
CO2 SERPL-SCNC: 29 MMOL/L (ref 21–32)
CREAT BLD-MCNC: 1.18 MG/DL
DEPRECATED RDW RBC AUTO: 47.2 FL (ref 35.1–46.3)
EOSINOPHIL # BLD AUTO: 0.42 X10(3) UL (ref 0–0.7)
EOSINOPHIL NFR BLD AUTO: 7.2 %
ERYTHROCYTE [DISTWIDTH] IN BLOOD BY AUTOMATED COUNT: 13.2 % (ref 11–15)
EST. AVERAGE GLUCOSE BLD GHB EST-MCNC: 120 MG/DL (ref 68–126)
GLOBULIN PLAS-MCNC: 3.2 G/DL (ref 2.8–4.4)
GLUCOSE BLD-MCNC: 92 MG/DL (ref 70–99)
HBA1C MFR BLD HPLC: 5.8 % (ref ?–5.7)
HCT VFR BLD AUTO: 46.6 %
HDLC SERPL-MCNC: 47 MG/DL (ref 40–59)
HGB BLD-MCNC: 14.7 G/DL
IMM GRANULOCYTES # BLD AUTO: 0.01 X10(3) UL (ref 0–1)
IMM GRANULOCYTES NFR BLD: 0.2 %
LDLC SERPL CALC-MCNC: 93 MG/DL (ref ?–100)
LYMPHOCYTES # BLD AUTO: 1.88 X10(3) UL (ref 1–4)
LYMPHOCYTES NFR BLD AUTO: 32.3 %
M PROTEIN MFR SERPL ELPH: 7.2 G/DL (ref 6.4–8.2)
MCH RBC QN AUTO: 30.1 PG (ref 26–34)
MCHC RBC AUTO-ENTMCNC: 31.5 G/DL (ref 31–37)
MCV RBC AUTO: 95.5 FL
MONOCYTES # BLD AUTO: 0.58 X10(3) UL (ref 0.1–1)
MONOCYTES NFR BLD AUTO: 10 %
NEUTROPHILS # BLD AUTO: 2.87 X10 (3) UL (ref 1.5–7.7)
NEUTROPHILS # BLD AUTO: 2.87 X10(3) UL (ref 1.5–7.7)
NEUTROPHILS NFR BLD AUTO: 49.3 %
NONHDLC SERPL-MCNC: 134 MG/DL (ref ?–130)
OSMOLALITY SERPL CALC.SUM OF ELEC: 305 MOSM/KG (ref 275–295)
PATIENT FASTING Y/N/NP: YES
PATIENT FASTING Y/N/NP: YES
PLATELET # BLD AUTO: 205 10(3)UL (ref 150–450)
POTASSIUM SERPL-SCNC: 4.8 MMOL/L (ref 3.5–5.1)
RBC # BLD AUTO: 4.88 X10(6)UL
SODIUM SERPL-SCNC: 146 MMOL/L (ref 136–145)
TRIGL SERPL-MCNC: 246 MG/DL (ref 30–149)
TSI SER-ACNC: 1.34 MIU/ML (ref 0.36–3.74)
VIT D+METAB SERPL-MCNC: 52.4 NG/ML (ref 30–100)
VLDLC SERPL CALC-MCNC: 40 MG/DL (ref 0–30)
WBC # BLD AUTO: 5.8 X10(3) UL (ref 4–11)

## 2021-08-24 PROCEDURE — 82306 VITAMIN D 25 HYDROXY: CPT

## 2021-08-24 PROCEDURE — 36415 COLL VENOUS BLD VENIPUNCTURE: CPT

## 2021-08-24 PROCEDURE — 83036 HEMOGLOBIN GLYCOSYLATED A1C: CPT

## 2021-08-24 PROCEDURE — 85025 COMPLETE CBC W/AUTO DIFF WBC: CPT

## 2021-08-24 PROCEDURE — 99214 OFFICE O/P EST MOD 30 MIN: CPT | Performed by: INTERNAL MEDICINE

## 2021-08-24 PROCEDURE — 84443 ASSAY THYROID STIM HORMONE: CPT

## 2021-08-24 PROCEDURE — 80061 LIPID PANEL: CPT

## 2021-08-24 PROCEDURE — 80053 COMPREHEN METABOLIC PANEL: CPT

## 2021-08-24 NOTE — PROGRESS NOTES
Durwood Nageotte is a 78year old female. HPI:   She is here for check up. She will be moving to her home town in [facility] next month. Generally she is feeling well and no new issues.      She has had an intermittent painful area on her scalp about the MOUTH NIGHTLY. 90 tablet 3   • furosemide 40 MG Oral Tab Take 40 mg by mouth as needed. • Potassium Chloride ER 20 MEQ Oral Tab CR Take 20 mEq by mouth as needed.        • Meclizine HCl 25 MG Oral Tab Take 1 tablet (25 mg total) by mouth 3 (three) nelsy lesions  HEENT: atraumatic, normocephalic,ears and throat are clear  NECK: supple,no adenopathy,no bruits  LUNGS: clear to auscultation  CARDIO: RRR without murmur  GI: good BS's,no masses, HSM or tenderness  EXTREMITIES: no cyanosis, clubbing or edema  Br

## 2021-08-26 ENCOUNTER — TELEPHONE (OUTPATIENT)
Dept: INTERNAL MEDICINE CLINIC | Facility: CLINIC | Age: 81
End: 2021-08-26

## 2021-08-26 NOTE — TELEPHONE ENCOUNTER
Please call pt with results of labs  Pt understood Dr Mario Romeo out of the office today  Tasked to nursing

## 2021-08-27 NOTE — TELEPHONE ENCOUNTER
Labs including CBC, CMP, lipids, thyroid, Vit D all normal - continue same meds     Chol good, TG a little elevated - just watch conc sweets and excessive carbs.     Tell her I wished her a very smooth with transition to living in [facility] and I wish her the

## 2021-08-27 NOTE — TELEPHONE ENCOUNTER
Spoke to pt and relayed MD message and instructions. Pt verbalized understanding and agrees with plan. Pt requesting a copy of her lab results to be mailed to her brother's house as she is living with him at this time.  Confirmed with pt, and mailed to [id number]

## 2021-10-05 RX ORDER — PRAVASTATIN SODIUM 40 MG
TABLET ORAL
Qty: 90 TABLET | Refills: 3 | Status: SHIPPED | OUTPATIENT
Start: 2021-10-05

## 2022-07-19 NOTE — TELEPHONE ENCOUNTER
[address] [place] [address],[place] [address] [address] [place] [address]  Dept: [phone]  Dept Fax: [phone]  Loc: [phone]    Visit Date: 1/22/2020    Mr. Ozzy Allen is a 65 y.o. male  who presented for:  Chief Complaint   Patient presents with    6 Month Follow-Up    Congestive Heart Failure       HPI:   HPI   66 yo F s/p CABG/MVR/Maze 1/2019, EF down to 25-30%, RVSP 59mmHg. He is more sob and having SHANNON. EF is getting worse. He is on OMT. He is compliant. He refuses LifeVest.  No PND, no orthopnea. No LE edema. No chest pain. He was doing well after the surgery and now has started to decline significantly. Family is present. He is unsure he wants to do any further procedures. He is absolutely against any possibility of dialysis. Last Cr 2.4. He makes urine. Current Outpatient Medications:     metOLazone (ZAROXOLYN) 2.5 MG tablet, Take 1 tab every Monday and Thursday AND as directed by clinic, Disp: 60 tablet, Rfl: 2    carvedilol (COREG) 12.5 MG tablet, Take 1 tablet by mouth 2 times daily (Patient taking differently: Take 12.5 mg by mouth 2 times daily Will take if B/P is okay.), Disp: 180 tablet, Rfl: 1    bumetanide (BUMEX) 2 MG tablet, Take 1 tablet by mouth 2 times daily, Disp: 60 tablet, Rfl: 3    ferrous sulfate 325 (65 Fe) MG tablet, Take 1 tablet by mouth 2 times daily, Disp: 60 tablet, Rfl: 3    isosorbide dinitrate (ISORDIL) 20 MG tablet, Take 1 tablet by mouth 2 times daily (Patient taking differently: Take 10 mg by mouth 2 times daily ), Disp: 60 tablet, Rfl: 5    lisinopril (PRINIVIL;ZESTRIL) 5 MG tablet, Take 2.5 mg by mouth daily Will take if BP okay, Disp: , Rfl:     potassium chloride (KLOR-CON M) 20 MEQ extended release tablet, Take by mouth 2 tab in am and 1 in pm, Disp: , Rfl:     Povidone-Iodine 10 % PADS, Please dispense 1 box of 10% iodine prep pads to pt with 3 refills. Will use PRN. , Disp: 1 each, Rfl: 3   Please advise - to DR. FAIRBANKS Insulin Glargine (LANTUS SC), Inject 18 mg into the skin, Disp: , Rfl:     nitroGLYCERIN (NITROSTAT) 0.4 MG SL tablet, Place 1 tablet under the tongue every 5 minutes as needed for Chest pain, Disp: 25 tablet, Rfl: 1    tamsulosin (FLOMAX) 0.4 MG capsule, Take 0.4 mg by mouth daily, Disp: , Rfl:     NONFORMULARY, Mulugeta-bacit-poly-lidocaine creame 4 % topical as prn  for pain, Disp: , Rfl:     timolol (TIMOPTIC) 0.5 % ophthalmic solution, Place 1 drop into both eyes daily, Disp: , Rfl:     Coenzyme Q10 (COQ-10) 50 MG CAPS, Take by mouth daily , Disp: , Rfl:     zinc oxide (TRIAD HYDROPHILIC) PSTE paste, Apply topically daily as needed , Disp: , Rfl:     atorvastatin (LIPITOR) 40 MG tablet, Take 40 mg by mouth daily, Disp: , Rfl:     miconazole (MICOTIN) 2 % powder, Apply topically 2 times daily. , Disp: 45 g, Rfl: 1    aspirin EC 81 MG EC tablet, Take 1 tablet by mouth daily, Disp: 30 tablet, Rfl: 3    acetaminophen (TYLENOL) 325 MG tablet, Take 650 mg by mouth every 4 hours as needed for Pain, Disp: , Rfl:     latanoprost (XALATAN) 0.005 % ophthalmic solution, Place 1 drop into both eyes nightly , Disp: , Rfl:     BD ULTRA-FINE MICRO PEN NEEDLE 32G X 6 MM MISC, , Disp: , Rfl:     Past Medical History  Yvette Rodas  has a past medical history of CAD (coronary artery disease), CHF (congestive heart failure) ([facility] [address] Chronic kidney disease, History of blood transfusion, Hyperlipidemia, Hypertension, Proteinuria, and Type II or unspecified type diabetes mellitus without mention of complication, not stated as uncontrolled. Social History  Yvette Rodas  reports that he quit smoking about 44 years ago. His smoking use included cigarettes. He has a 2.50 pack-year smoking history. He has quit using smokeless tobacco. He reports that he does not drink alcohol. Family History  Yvette Rodas family history includes Cancer in his maternal grandmother and mother; Diabetes in his father and mother; Heart Disease in his father.     There is no family history of bicuspid aortic valve, aneurysms, heart transplant, pacemakers, defibrillators, or sudden cardiac death. Past Surgical History   Past Surgical History:   Procedure Laterality Date    CARDIAC SURGERY      COLONOSCOPY  2019    at [address] CORONARY ARTERY BYPASS GRAFT N/A 1/28/2019    CABG X3 MVR / MAZE performed by Teodoro Hodges MD at [facility] N/A 1/21/2019    EGD DIAGNOSTIC ONLY performed by Chasidy Villalba MD at [facility]       Review of Systems   Constitutional: Negative for chills and fever  HENT: Negative for congestion, sinus pressure, sneezing and sore throat. Eyes: Negative for pain, discharge, redness and itching. Respiratory: Negative for apnea, cough  Gastrointestinal: Negative for blood in stool, constipation, diarrhea   Endocrine: Negative for cold intolerance, heat intolerance, polydipsia. Genitourinary: Negative for dysuria, enuresis, flank pain and hematuria. Musculoskeletal: Negative for arthralgias, joint swelling and neck pain. Neurological: Negative for numbness and headaches. Psychiatric/Behavioral: Negative for agitation, confusion, decreased concentration and dysphoric mood. Objective:     BP (!) 150/62   Pulse 72   Ht 5' 11\" (1.803 m)   Wt 159 lb 3.2 oz (72.2 kg)   BMI 22.20 kg/m²     Wt Readings from Last 3 Encounters:   01/22/20 159 lb 3.2 oz (72.2 kg)   01/13/20 157 lb 6.4 oz (71.4 kg)   12/19/19 160 lb (72.6 kg)     BP Readings from Last 3 Encounters:   01/22/20 (!) 150/62   01/13/20 122/64   12/19/19 126/62       Nursing note and vitals reviewed. Physical Exam   Constitutional: Oriented to person, place, and time. Appears fatigued. HENT:   Head: Normocephalic and atraumatic. Eyes: EOM are normal. Pupils are equal, round, and reactive to light. Neck: Normal range of motion. Neck supple. No JVD present.    Cardiovascular: Normal rate, regular rhythm, normal heart sounds and intact distal pulses. No murmur heard. Pulmonary/Chest: Effort normal and breath sounds normal. No respiratory distress. No wheezes. No rales. Abdominal: Soft. Bowel sounds are normal. No distension. There is no tenderness. Musculoskeletal: Normal range of motion. No edema. Neurological: Alert and oriented to person, place, and time. No cranial nerve deficit. Coordination normal.   Skin: Skin is warm and dry. Psychiatric: Normal mood and affect.        Lab Results   Component Value Date    CKTOTAL 28 01/18/2019       Lab Results   Component Value Date    WBC 6.0 12/16/2019    RBC 4.24 12/16/2019    HGB 10.8 12/16/2019    HCT 33.3 12/16/2019    MCV 86.0 12/02/2019    MCH 25.3 12/02/2019    MCHC 29.4 12/02/2019     12/16/2019    MPV 11.3 12/02/2019       Lab Results   Component Value Date     01/13/2020    K 4.3 01/13/2020    K 4.0 01/23/2019    CL 96 01/13/2020    CO2 28 01/13/2020    BUN 66 01/13/2020    LABALBU 3.6 12/02/2019    CREATININE 2.4 01/13/2020    CALCIUM 9.0 01/13/2020    LABGLOM 27 01/13/2020    GLUCOSE 179 01/13/2020       Lab Results   Component Value Date    ALKPHOS 92 12/02/2019    ALT 17 12/02/2019    AST 19 12/02/2019    PROT 8.0 12/02/2019    BILITOT 0.4 12/02/2019    BILIDIR <0.2 12/02/2019    LABALBU 3.6 12/02/2019       Lab Results   Component Value Date    MG 2.2 12/02/2019       Lab Results   Component Value Date    INR 1.08 02/05/2019    INR 0.99 02/04/2019    INR 1.00 02/03/2019         No results found for: LABA1C    Lab Results   Component Value Date    TRIG 110 12/16/2019    HDL 40 12/16/2019    LDLCALC 72 12/16/2019       Lab Results   Component Value Date    TSH 3.290 01/20/2019         Testing Reviewed:      I have individually reviewed the cardiac test below:    ECHO:   Results for orders placed during the hospital encounter of 12/13/19   Echo 2D w doppler w color complete    Narrative Transthoracic Echocardiography Report (TTE)     Demographics Patient Name  Young Sharp Gender             Male      MR #          [id number]   Race                                               Ethnicity      Account #     [de-identified]   Room Number      Accession     [id number]   Date of Study      12/13/2019   Number      Date of Birth 1949  Referring          Júnior Richardson                             Physician          Bonny Osullivan CNP      Age           65 year(s)  Sonographer        ABBY Delacruz, RDCS,                                                RDMS, RVT                                Interpreting       Keesha Delong MD                             Physician     Procedure    Type of Study      TTE procedure:ECHOCARDIOGRAM COMPLETE 2D W DOPPLER W COLOR. Procedure Date  Date: 12/13/2019 Start: 03:29 PM    Study Location: Echo Lab  Technical Quality: Limited visualization due to body habitus. Indications:Congestive heart failure and Pleural effusion. Additional Medical History:coronary artery bypass grafting, Maze procedure,  mitral valve replacement, diabetes, hypertension, hyperlipidemia, exsmoker,  NSTEMI, history of cardiac arrest, chronic kidney disease, anemia, ischemic  cardiomyopathy, congestive heart failure    Patient Status: Routine    Height: 71 inches Weight: 159 pounds BSA: 1.91 m^2 BMI: 22.18 kg/m^2    BP: 144/60 mmHg    Allergies    - See Epic. Conclusions      Summary   Ejection fraction was estimated at 25-30%. Left atrial size was severely dilated. S/p MV replacement - probable bioprosthetic. DOPPLER: The transmitral   velocity was within the normal range with no evidence for mitral stenosis   (mean gradient 6 mmHg, V max 2.0 m/s, P1/2T = 60 ms). There was mild   mitral regurgitation. There was mild-moderate aortic regurgitation. There was mild tricuspid regurgitation. Assuming RAP = 5 mmHg, the   estimated RVSP = 59 mmHg.       Signature      ---------------------------------------------------------------- Electronically signed by Marquis Jeff LYNN (Interpreting   physician) on 12/16/2019 at 05:36 PM   ----------------------------------------------------------------      Findings      Mitral Valve   S/p MV replacement - probable bioprosthetic. DOPPLER: The transmitral   velocity was within the normal range with no evidence for mitral stenosis   (mean gradient 6 mmHg, V max 2.0 m/s, P1/2T = 60 ms). There was mild   mitral regurgitation. Aortic Valve   The aortic valve was trileaflet with normal thickness and cuspal   separation. DOPPLER: Transaortic velocity was within the normal range with   no evidence of aortic stenosis. There was mild-moderate aortic   regurgitation. Tricuspid Valve   The tricuspid valve structure was normal with normal leaflet separation. DOPPLER: There was no evidence of tricuspid stenosis. There was mild   tricuspid regurgitation. Assuming RAP = 5 mmHg, the estimated RVSP = 59   mmHg. Pulmonic Valve   The pulmonic valve leaflets were not well seen. DOPPLER: The transpulmonic   velocity was within the normal range with no evidence for regurgitation. Left Atrium   Left atrial size was severely dilated. Left Ventricle   Normal left ventricular size and severely reduced systolic function. There was severe global hypokinesis. Mild concentric hypertrophy. Ejection fraction was estimated at 25-30%. Right Atrium   Right atrial size was normal.      Right Ventricle   The right ventricular size was normal with normal systolic function and   wall thickness. Pericardial Effusion   The pericardium was normal in appearance with no evidence of a pericardial   effusion. Pleural Effusion   No evidence of pleural effusion. Aorta / Great Vessels   IVC size is within normal limits with normal respiratory phasic changes.      M-Mode/2D Measurements & Calculations      LV Diastolic   LV Systolic Dimension:    AV Cusp Separation: 1.6 cmLA   Dimension: 4.5 4.1 cm unfortunately cannot have cardiac cath as this is real possibility given his baseline Cr. He is very symptomatic with SHANNON, but no volume on exam.  He is on ASA, Lipitor, Coreg, Bumex, Isordil, Lisinopril, Metolazone. Absolutely refused lifevest. He thus needs to consider hospice and palliative care because any decompensation will lead to more procedures and possibly dialysis. .  As his cardiac function continues to deteriorate and we are unable to assess for continued revascularization, the best option is to make a long-term plan in accordance with his goals of care. Family and patient agree. If they want to proceed, we will follow-up with cardiac catheterization, otherwise he will follow-up with his PCP.         Disposition:  6 months         Electronically signed by Tita Ibarra MD   1/22/2020 at 3:57 PM Advancement Flap (Single) Text: Given the location of the defect, inherent tension at the surgical site, and the proximity to free margins an advancement flap was deemed most appropriate for wound reconstruction. The risks, benefits, and possible outcomes of this procedure were discussed along with the risks, benefits, and possible outcomes of other options for wound repair (including but not limited to: complex closure, other flaps, grafts, and second intention). The patient verbalized understanding and consent to the outlined procedure. The patient verbalized understanding that intraoperative conditions may necessitate a change in the outlined procedure resulting in modification of the original surgical plan. This decision may be made at the discretion of the surgeon, and is due to factors subject to change or that are difficult to predict preoperatively. Using a sterile surgical marker, an appropriate advancement flap was drawn incorporating the defect and placing the expected incisions within the relaxed skin tension lines where possible. The surgical site and surrounding skin were prepped and draped in sterile fashion.  Standing cones were removed from opposite ends of the defect within the appropriate surgical plane, repositioning as necessary based upon local tension, proximity to free margins/other anatomic structures, and position of the relaxed skin tension lines. The resulting defect was undermined widely and bilaterally in the appropriate surgical plane taking care to preserve local arteries, veins, nerves, and other structures of anatomic importance. This created a sliding flap capable of advancing across the defect to close the wound under minimal tension. Hemostasis was achieved and then the wound was sutured in layered fashion.

## 2023-12-22 NOTE — TELEPHONE ENCOUNTER
LMTCB - do not see any documentation from Friday 11/3 Pts son called to say his mother has been giving the home care aides a hard time. She wont get up and do things. She told one of the aides she has bed sores but she is refusing treatment. She tells him she is fine and does not need anything. He does not think she is seeing behavioral health anymore. Pt has even refused to eat in the past and she can be noncompliant with her meds as well. He is concerned that she is just giving up. Please call pts son back.

## (undated) DEVICE — SPECIMEN TRAP LUKI

## (undated) DEVICE — ENDOSCOPY PACK - LOWER: Brand: MEDLINE INDUSTRIES, INC.

## (undated) DEVICE — SNARE CAPTIFLEX MICRO-OVL OLY

## (undated) DEVICE — Device: Brand: DEFENDO AIR/WATER/SUCTION AND BIOPSY VALVE

## (undated) DEVICE — Device: Brand: SPOT EX ENDOSCOPIC TATTOO

## (undated) DEVICE — HEXAGONAL CAPTIVATOR STIFF 13M

## (undated) DEVICE — CLIP RESOLUTION 235CM

## (undated) NOTE — ED AVS SNAPSHOT
Glorine Nissen   MRN: [id number]    Department:  [facility] Emergency Department   Date of Visit:  4/10/2019           Disclosure     Insurance plans vary and the physician(s) referred by the ER may not be covered by your plan.  Please contact tell this physician (or your personal doctor if your instructions are to return to your personal doctor) about any new or lasting problems. The primary care or specialist physician will see patients referred from the [facility] Emergency Department.  Sally Escobedo

## (undated) NOTE — MR AVS SNAPSHOT
[place]  [facility] [address] [place] [address]  [phone]               Thank you for choosing us for your health care visit with Bertha Butcher MD.  We are glad to serve you and happy to provide you with this summary of your visit.   Shruti Commonly known as:  LOPRESSOR           Pravastatin Sodium 40 MG Tabs   TAKE 1 TABLET BY MOUTH NIGHTLY. Commonly known as:  PRAVACHOL           Solifenacin Succinate 5 MG Tabs   Take 1 tablet (5 mg total) by mouth daily.    Commonly known as:  Rene Rosa

## (undated) NOTE — ED AVS SNAPSHOT
Patt Young   MRN: [id number]    Department:  [facility] Emergency Department   Date of Visit:  11/26/2019           Disclosure     Insurance plans vary and the physician(s) referred by the ER may not be covered by your plan.  Please contact tell this physician (or your personal doctor if your instructions are to return to your personal doctor) about any new or lasting problems. The primary care or specialist physician will see patients referred from the [facility] Emergency Department.  Galina Jean Baptiste

## (undated) NOTE — ED AVS SNAPSHOT
Silvia Pope   MRN: [id number]    Department:  [facility] Emergency Department   Date of Visit:  4/8/2019           Disclosure     Insurance plans vary and the physician(s) referred by the ER may not be covered by your plan.  Please contact y tell this physician (or your personal doctor if your instructions are to return to your personal doctor) about any new or lasting problems. The primary care or specialist physician will see patients referred from the [facility] Emergency Department.  Wing Toussaint

## (undated) NOTE — MR AVS SNAPSHOT
[address]  [phone]               Thank you for choosing us for your health care visit with Joselo Chahal MD.  We are glad to serve you and happy to provide you with this summar Commonly known as:  COZAAR           Meclizine HCl 25 MG Tabs   Take 1 tablet by mouth every 6 (six) hours as needed. Commonly known as:  ANTIVERT           Metoprolol Tartrate 100 MG Tabs   Take 1 tablet by mouth 2 (two) times daily with meals.    Common

## (undated) NOTE — MR AVS SNAPSHOT
[place]  [address]  [phone]               Thank you for choosing us for your health care visit with Gabriel Ascencio MD.  We are glad to serve you and happy to provide you with this summary TAKE 1 CAPSULE BY MOUTH ONCE A WEEK. Commonly known as:  DRISDOL/VITAMIN D2           furosemide 40 MG Tabs   Take 40 mg by mouth daily.    Commonly known as:  LASIX           Losartan Potassium 50 MG Tabs   TAKE 1 TABLET BY MOUTH EVERY DAY   Commonly evgeny

## (undated) NOTE — MR AVS SNAPSHOT
[place]  [address]  [phone]               Thank you for choosing us for your health care visit with Brooks Chung MD.  We are glad to serve you and happy to provide you with this summary Commonly known as:  LOPRESSOR           Pravastatin Sodium 40 MG Tabs   TAKE 1 TABLET BY MOUTH NIGHTLY. Commonly known as:  PRAVACHOL           Solifenacin Succinate 5 MG Tabs   Take 1 tablet (5 mg total) by mouth daily.    Commonly known as:  Faizan Casper

## (undated) NOTE — MR AVS SNAPSHOT
[place]  [facility] [address] [place] [address]  [phone]               Thank you for choosing us for your health care visit with Delonte Butcher MD.  We are glad to serve you and happy to provide you with this summary of your visit.   Shruti Urinary incontinence, unspecified type [R32], Hypercholesteremia [E78.00], Encounter for Medicare annual wellness exam [Z00.00], Vitamin D deficiency [E55.9]           Vitamin D, 25-Hydroxy [E]    Complete by: May 15, 2017 (Approximate)    Assoc Dx:   Epig Hypertension    Urinary incontinence    Vitamin D deficiency    Dyspnea, unspecified type          Instructions and Information about Your Health     None      Allergies as of May 15, 2017     No Known Allergies                Today's Vital Signs     BP P If you have questions, you can call [phone] to talk to our [facility] Staff. Remember, [employer] is NOT to be used for urgent needs. For medical emergencies, dial 911. Visit [email or web address] to learn more.         Educational Infor